# Patient Record
Sex: MALE | Race: BLACK OR AFRICAN AMERICAN | NOT HISPANIC OR LATINO | Employment: UNEMPLOYED | URBAN - METROPOLITAN AREA
[De-identification: names, ages, dates, MRNs, and addresses within clinical notes are randomized per-mention and may not be internally consistent; named-entity substitution may affect disease eponyms.]

---

## 2021-11-20 ENCOUNTER — HOSPITAL ENCOUNTER (EMERGENCY)
Facility: HOSPITAL | Age: 56
Discharge: HOME | End: 2021-11-20
Attending: EMERGENCY MEDICINE
Payer: MEDICAID

## 2021-11-20 VITALS
DIASTOLIC BLOOD PRESSURE: 88 MMHG | RESPIRATION RATE: 16 BRPM | HEART RATE: 80 BPM | OXYGEN SATURATION: 100 % | HEIGHT: 68 IN | BODY MASS INDEX: 28.34 KG/M2 | SYSTOLIC BLOOD PRESSURE: 145 MMHG | TEMPERATURE: 98 F | WEIGHT: 187 LBS

## 2021-11-20 DIAGNOSIS — R42 DIZZINESS: ICD-10-CM

## 2021-11-20 DIAGNOSIS — M25.569 KNEE PAIN: Primary | ICD-10-CM

## 2021-11-20 DIAGNOSIS — R06.02 SOB (SHORTNESS OF BREATH): ICD-10-CM

## 2021-11-20 DIAGNOSIS — R55 VAGAL REACTION: ICD-10-CM

## 2021-11-20 LAB
ALBUMIN SERPL BCP-MCNC: 3.9 G/DL (ref 3.5–5.2)
ALP SERPL-CCNC: 124 U/L (ref 55–135)
ALT SERPL W/O P-5'-P-CCNC: 15 U/L (ref 10–44)
ANION GAP SERPL CALC-SCNC: 9 MMOL/L (ref 8–16)
AST SERPL-CCNC: 18 U/L (ref 10–40)
BASOPHILS # BLD AUTO: 0.03 K/UL (ref 0–0.2)
BASOPHILS NFR BLD: 0.4 % (ref 0–1.9)
BILIRUB SERPL-MCNC: 0.9 MG/DL (ref 0.1–1)
BUN SERPL-MCNC: 13 MG/DL (ref 6–20)
CALCIUM SERPL-MCNC: 9.8 MG/DL (ref 8.7–10.5)
CHLORIDE SERPL-SCNC: 103 MMOL/L (ref 95–110)
CO2 SERPL-SCNC: 25 MMOL/L (ref 23–29)
CREAT SERPL-MCNC: 1 MG/DL (ref 0.5–1.4)
CRP SERPL-MCNC: 26.5 MG/L (ref 0–8.2)
DIFFERENTIAL METHOD: ABNORMAL
EOSINOPHIL # BLD AUTO: 0.3 K/UL (ref 0–0.5)
EOSINOPHIL NFR BLD: 3.8 % (ref 0–8)
ERYTHROCYTE [DISTWIDTH] IN BLOOD BY AUTOMATED COUNT: 14.4 % (ref 11.5–14.5)
ERYTHROCYTE [SEDIMENTATION RATE] IN BLOOD BY WESTERGREN METHOD: 96 MM/HR (ref 0–23)
EST. GFR  (AFRICAN AMERICAN): >60 ML/MIN/1.73 M^2
EST. GFR  (NON AFRICAN AMERICAN): >60 ML/MIN/1.73 M^2
GLUCOSE SERPL-MCNC: 106 MG/DL (ref 70–110)
HCT VFR BLD AUTO: 46.7 % (ref 40–54)
HGB BLD-MCNC: 14.6 G/DL (ref 14–18)
IMM GRANULOCYTES # BLD AUTO: 0.02 K/UL (ref 0–0.04)
IMM GRANULOCYTES NFR BLD AUTO: 0.3 % (ref 0–0.5)
LYMPHOCYTES # BLD AUTO: 1.1 K/UL (ref 1–4.8)
LYMPHOCYTES NFR BLD: 14.9 % (ref 18–48)
MCH RBC QN AUTO: 23.9 PG (ref 27–31)
MCHC RBC AUTO-ENTMCNC: 31.3 G/DL (ref 32–36)
MCV RBC AUTO: 76 FL (ref 82–98)
MONOCYTES # BLD AUTO: 0.7 K/UL (ref 0.3–1)
MONOCYTES NFR BLD: 8.7 % (ref 4–15)
NEUTROPHILS # BLD AUTO: 5.4 K/UL (ref 1.8–7.7)
NEUTROPHILS NFR BLD: 71.9 % (ref 38–73)
NRBC BLD-RTO: 0 /100 WBC
PLATELET # BLD AUTO: 194 K/UL (ref 150–450)
PMV BLD AUTO: 9 FL (ref 9.2–12.9)
POCT GLUCOSE: 104 MG/DL (ref 70–110)
POTASSIUM SERPL-SCNC: 4.6 MMOL/L (ref 3.5–5.1)
PROT SERPL-MCNC: 7.7 G/DL (ref 6–8.4)
RBC # BLD AUTO: 6.11 M/UL (ref 4.6–6.2)
SODIUM SERPL-SCNC: 137 MMOL/L (ref 136–145)
TROPONIN I SERPL DL<=0.01 NG/ML-MCNC: 0.02 NG/ML (ref 0–0.03)
WBC # BLD AUTO: 7.56 K/UL (ref 3.9–12.7)

## 2021-11-20 PROCEDURE — 85025 COMPLETE CBC W/AUTO DIFF WBC: CPT

## 2021-11-20 PROCEDURE — 99285 EMERGENCY DEPT VISIT HI MDM: CPT | Mod: 25

## 2021-11-20 PROCEDURE — 86140 C-REACTIVE PROTEIN: CPT

## 2021-11-20 PROCEDURE — 84484 ASSAY OF TROPONIN QUANT: CPT | Performed by: EMERGENCY MEDICINE

## 2021-11-20 PROCEDURE — 93010 EKG 12-LEAD: ICD-10-PCS | Mod: ,,, | Performed by: INTERNAL MEDICINE

## 2021-11-20 PROCEDURE — 80053 COMPREHEN METABOLIC PANEL: CPT

## 2021-11-20 PROCEDURE — 93005 ELECTROCARDIOGRAM TRACING: CPT

## 2021-11-20 PROCEDURE — 99284 PR EMERGENCY DEPT VISIT,LEVEL IV: ICD-10-PCS | Mod: 25,,, | Performed by: EMERGENCY MEDICINE

## 2021-11-20 PROCEDURE — 25000003 PHARM REV CODE 250

## 2021-11-20 PROCEDURE — 85652 RBC SED RATE AUTOMATED: CPT

## 2021-11-20 PROCEDURE — 99284 EMERGENCY DEPT VISIT MOD MDM: CPT | Mod: 25,,, | Performed by: EMERGENCY MEDICINE

## 2021-11-20 PROCEDURE — 93010 ELECTROCARDIOGRAM REPORT: CPT | Mod: 77,,, | Performed by: INTERNAL MEDICINE

## 2021-11-20 PROCEDURE — 82962 GLUCOSE BLOOD TEST: CPT

## 2021-11-20 PROCEDURE — 76882 US LMTD JT/FCL EVL NVASC XTR: CPT | Mod: 26,,, | Performed by: EMERGENCY MEDICINE

## 2021-11-20 PROCEDURE — 93010 EKG 12-LEAD: ICD-10-PCS | Mod: 77,,, | Performed by: INTERNAL MEDICINE

## 2021-11-20 PROCEDURE — 76882 PR  US,EXTREMITY,NONVASCULAR,REAL-TIME IMAGE,LIMITED: ICD-10-PCS | Mod: 26,,, | Performed by: EMERGENCY MEDICINE

## 2021-11-20 PROCEDURE — 93010 ELECTROCARDIOGRAM REPORT: CPT | Mod: ,,, | Performed by: INTERNAL MEDICINE

## 2021-11-20 RX ORDER — NAPROXEN 500 MG/1
500 TABLET ORAL
Status: COMPLETED | OUTPATIENT
Start: 2021-11-20 | End: 2021-11-20

## 2021-11-20 RX ORDER — ACETAMINOPHEN 500 MG
500 TABLET ORAL EVERY 6 HOURS PRN
Qty: 40 TABLET | Refills: 0 | Status: SHIPPED | OUTPATIENT
Start: 2021-11-20 | End: 2021-11-30

## 2021-11-20 RX ORDER — NAPROXEN 500 MG/1
500 TABLET ORAL 2 TIMES DAILY WITH MEALS
Qty: 60 TABLET | Refills: 0 | Status: SHIPPED | OUTPATIENT
Start: 2021-11-20

## 2021-11-20 RX ADMIN — NAPROXEN 500 MG: 500 TABLET ORAL at 09:11

## 2022-01-04 ENCOUNTER — TELEPHONE (OUTPATIENT)
Dept: ORTHOPEDICS | Facility: CLINIC | Age: 57
End: 2022-01-04
Payer: MEDICAID

## 2022-01-04 ENCOUNTER — OFFICE VISIT (OUTPATIENT)
Dept: ORTHOPEDICS | Facility: CLINIC | Age: 57
End: 2022-01-04
Payer: MEDICAID

## 2022-01-04 ENCOUNTER — HOSPITAL ENCOUNTER (OUTPATIENT)
Dept: RADIOLOGY | Facility: HOSPITAL | Age: 57
Discharge: HOME | End: 2022-01-04
Attending: ORTHOPAEDIC SURGERY
Payer: MEDICAID

## 2022-01-04 VITALS
BODY MASS INDEX: 28.29 KG/M2 | DIASTOLIC BLOOD PRESSURE: 87 MMHG | HEIGHT: 67 IN | HEART RATE: 82 BPM | SYSTOLIC BLOOD PRESSURE: 135 MMHG | WEIGHT: 180.25 LBS

## 2022-01-04 DIAGNOSIS — T84.84XA PAIN DUE TO TOTAL LEFT KNEE REPLACEMENT, INITIAL ENCOUNTER: Primary | ICD-10-CM

## 2022-01-04 DIAGNOSIS — M25.562 ACUTE PAIN OF LEFT KNEE: Primary | ICD-10-CM

## 2022-01-04 DIAGNOSIS — Z96.652 PAIN DUE TO TOTAL LEFT KNEE REPLACEMENT, INITIAL ENCOUNTER: Primary | ICD-10-CM

## 2022-01-04 DIAGNOSIS — M25.562 ACUTE PAIN OF LEFT KNEE: ICD-10-CM

## 2022-01-04 PROCEDURE — 3079F DIAST BP 80-89 MM HG: CPT | Mod: CPTII,,, | Performed by: ORTHOPAEDIC SURGERY

## 2022-01-04 PROCEDURE — 1159F PR MEDICATION LIST DOCUMENTED IN MEDICAL RECORD: ICD-10-PCS | Mod: CPTII,,, | Performed by: ORTHOPAEDIC SURGERY

## 2022-01-04 PROCEDURE — 99999 PR PBB SHADOW E&M-EST. PATIENT-LVL III: ICD-10-PCS | Mod: PBBFAC,,, | Performed by: ORTHOPAEDIC SURGERY

## 2022-01-04 PROCEDURE — 3075F SYST BP GE 130 - 139MM HG: CPT | Mod: CPTII,,, | Performed by: ORTHOPAEDIC SURGERY

## 2022-01-04 PROCEDURE — 73564 X-RAY EXAM KNEE 4 OR MORE: CPT | Mod: 26,LT,, | Performed by: RADIOLOGY

## 2022-01-04 PROCEDURE — 20610 DRAIN/INJ JOINT/BURSA W/O US: CPT | Mod: PBBFAC,PN,LT | Performed by: ORTHOPAEDIC SURGERY

## 2022-01-04 PROCEDURE — 3008F BODY MASS INDEX DOCD: CPT | Mod: CPTII,,, | Performed by: ORTHOPAEDIC SURGERY

## 2022-01-04 PROCEDURE — 73564 X-RAY EXAM KNEE 4 OR MORE: CPT | Mod: TC,FY,LT

## 2022-01-04 PROCEDURE — 99204 PR OFFICE/OUTPT VISIT, NEW, LEVL IV, 45-59 MIN: ICD-10-PCS | Mod: 25,S$PBB,, | Performed by: ORTHOPAEDIC SURGERY

## 2022-01-04 PROCEDURE — 99999 PR PBB SHADOW E&M-EST. PATIENT-LVL III: CPT | Mod: PBBFAC,,, | Performed by: ORTHOPAEDIC SURGERY

## 2022-01-04 PROCEDURE — 20610 LARGE JOINT ASPIRATION/INJECTION: L KNEE: ICD-10-PCS | Mod: S$PBB,LT,, | Performed by: ORTHOPAEDIC SURGERY

## 2022-01-04 PROCEDURE — 73564 XR KNEE COMP 4 OR MORE VIEWS LEFT: ICD-10-PCS | Mod: 26,LT,, | Performed by: RADIOLOGY

## 2022-01-04 PROCEDURE — 99204 OFFICE O/P NEW MOD 45 MIN: CPT | Mod: 25,S$PBB,, | Performed by: ORTHOPAEDIC SURGERY

## 2022-01-04 PROCEDURE — 3008F PR BODY MASS INDEX (BMI) DOCUMENTED: ICD-10-PCS | Mod: CPTII,,, | Performed by: ORTHOPAEDIC SURGERY

## 2022-01-04 PROCEDURE — 99213 OFFICE O/P EST LOW 20 MIN: CPT | Mod: PBBFAC,PN,25 | Performed by: ORTHOPAEDIC SURGERY

## 2022-01-04 PROCEDURE — 3075F PR MOST RECENT SYSTOLIC BLOOD PRESS GE 130-139MM HG: ICD-10-PCS | Mod: CPTII,,, | Performed by: ORTHOPAEDIC SURGERY

## 2022-01-04 PROCEDURE — 3079F PR MOST RECENT DIASTOLIC BLOOD PRESSURE 80-89 MM HG: ICD-10-PCS | Mod: CPTII,,, | Performed by: ORTHOPAEDIC SURGERY

## 2022-01-04 PROCEDURE — 1159F MED LIST DOCD IN RCRD: CPT | Mod: CPTII,,, | Performed by: ORTHOPAEDIC SURGERY

## 2022-01-11 ENCOUNTER — OFFICE VISIT (OUTPATIENT)
Dept: ORTHOPEDICS | Facility: CLINIC | Age: 57
End: 2022-01-11
Payer: MEDICAID

## 2022-01-11 VITALS
BODY MASS INDEX: 28.25 KG/M2 | DIASTOLIC BLOOD PRESSURE: 95 MMHG | HEIGHT: 67 IN | HEART RATE: 84 BPM | SYSTOLIC BLOOD PRESSURE: 148 MMHG | WEIGHT: 180 LBS

## 2022-01-11 DIAGNOSIS — T84.84XA PAIN DUE TO TOTAL LEFT KNEE REPLACEMENT, INITIAL ENCOUNTER: ICD-10-CM

## 2022-01-11 DIAGNOSIS — Z96.652 PAIN DUE TO TOTAL LEFT KNEE REPLACEMENT, INITIAL ENCOUNTER: ICD-10-CM

## 2022-01-11 DIAGNOSIS — T84.89XA POSTOPERATIVE STIFFNESS OF TOTAL KNEE REPLACEMENT, INITIAL ENCOUNTER: ICD-10-CM

## 2022-01-11 DIAGNOSIS — Z96.659 POSTOPERATIVE STIFFNESS OF TOTAL KNEE REPLACEMENT, INITIAL ENCOUNTER: ICD-10-CM

## 2022-01-11 DIAGNOSIS — M25.669 POSTOPERATIVE STIFFNESS OF TOTAL KNEE REPLACEMENT, INITIAL ENCOUNTER: ICD-10-CM

## 2022-01-11 DIAGNOSIS — G89.29 CHRONIC PAIN OF LEFT KNEE: Primary | ICD-10-CM

## 2022-01-11 DIAGNOSIS — M25.562 CHRONIC PAIN OF LEFT KNEE: Primary | ICD-10-CM

## 2022-01-11 PROCEDURE — 99213 OFFICE O/P EST LOW 20 MIN: CPT | Mod: PBBFAC,PN | Performed by: ORTHOPAEDIC SURGERY

## 2022-01-11 PROCEDURE — 3008F BODY MASS INDEX DOCD: CPT | Mod: CPTII,,, | Performed by: ORTHOPAEDIC SURGERY

## 2022-01-11 PROCEDURE — 99214 PR OFFICE/OUTPT VISIT, EST, LEVL IV, 30-39 MIN: ICD-10-PCS | Mod: S$PBB,,, | Performed by: ORTHOPAEDIC SURGERY

## 2022-01-11 PROCEDURE — 3080F PR MOST RECENT DIASTOLIC BLOOD PRESSURE >= 90 MM HG: ICD-10-PCS | Mod: CPTII,,, | Performed by: ORTHOPAEDIC SURGERY

## 2022-01-11 PROCEDURE — 3077F PR MOST RECENT SYSTOLIC BLOOD PRESSURE >= 140 MM HG: ICD-10-PCS | Mod: CPTII,,, | Performed by: ORTHOPAEDIC SURGERY

## 2022-01-11 PROCEDURE — 99999 PR PBB SHADOW E&M-EST. PATIENT-LVL III: ICD-10-PCS | Mod: PBBFAC,,, | Performed by: ORTHOPAEDIC SURGERY

## 2022-01-11 PROCEDURE — 1159F PR MEDICATION LIST DOCUMENTED IN MEDICAL RECORD: ICD-10-PCS | Mod: CPTII,,, | Performed by: ORTHOPAEDIC SURGERY

## 2022-01-11 PROCEDURE — 3077F SYST BP >= 140 MM HG: CPT | Mod: CPTII,,, | Performed by: ORTHOPAEDIC SURGERY

## 2022-01-11 PROCEDURE — 99999 PR PBB SHADOW E&M-EST. PATIENT-LVL III: CPT | Mod: PBBFAC,,, | Performed by: ORTHOPAEDIC SURGERY

## 2022-01-11 PROCEDURE — 99214 OFFICE O/P EST MOD 30 MIN: CPT | Mod: S$PBB,,, | Performed by: ORTHOPAEDIC SURGERY

## 2022-01-11 PROCEDURE — 3080F DIAST BP >= 90 MM HG: CPT | Mod: CPTII,,, | Performed by: ORTHOPAEDIC SURGERY

## 2022-01-11 PROCEDURE — 1159F MED LIST DOCD IN RCRD: CPT | Mod: CPTII,,, | Performed by: ORTHOPAEDIC SURGERY

## 2022-01-11 PROCEDURE — 3008F PR BODY MASS INDEX (BMI) DOCUMENTED: ICD-10-PCS | Mod: CPTII,,, | Performed by: ORTHOPAEDIC SURGERY

## 2022-01-11 RX ORDER — FAMOTIDINE 20 MG/1
20 TABLET, FILM COATED ORAL
COMMUNITY
Start: 2021-07-09 | End: 2022-07-09

## 2022-01-11 RX ORDER — ETODOLAC 500 MG/1
TABLET, FILM COATED ORAL
COMMUNITY

## 2022-01-11 RX ORDER — DICLOFENAC SODIUM 10 MG/G
GEL TOPICAL
COMMUNITY

## 2022-01-11 RX ORDER — LOSARTAN POTASSIUM AND HYDROCHLOROTHIAZIDE 12.5; 5 MG/1; MG/1
TABLET ORAL
COMMUNITY

## 2022-01-11 RX ORDER — GABAPENTIN 300 MG/1
300 CAPSULE ORAL
COMMUNITY
End: 2022-07-12

## 2022-01-11 RX ORDER — IBUPROFEN 600 MG/1
600 TABLET ORAL EVERY 8 HOURS PRN
COMMUNITY
Start: 2021-09-29 | End: 2022-09-15 | Stop reason: SDUPTHER

## 2022-01-11 RX ORDER — KETOCONAZOLE 20 MG/G
CREAM TOPICAL
COMMUNITY
Start: 2021-11-02

## 2022-01-11 NOTE — PROGRESS NOTES
[facility] [address]          Subjective:      Patient ID: Paulo Anderson is a 56 y.o. male.    Chief Complaint: Pain of the Left Knee    Patient is a 56 year old male hx of left knee arthritis who underwent left TKA on  at [facility] who presents today for swollen, painful and stiff knee. Patient states that he has had pain and stiffness since his surgery. Denies any time period where he was completely asymptomatic. He underwent 2 BLAINE's at [facility], neither of which improved his motion significantly. He has been going to therapy several times per week with minimal to no improvement. He denies any post operative drainage or need for antibiotics. Patient was seen by Dr. Kennedy 1 week ago who performed a left knee aspiration to rule out infection. Aspiration demonstrated no evidence of infection with negative alpha defensin and WBC count of 272. Today his main symptoms are associated with his stiffness and tenderness over the anterior knee and tibia. He ambulates with a crutch. Denies any trauma or injury.      History reviewed. No pertinent past medical history.     Past Surgical History:   Procedure Laterality Date    KNEE SURGERY  2021        Current Outpatient Medications   Medication Instructions    diclofenac sodium (VOLTAREN) 1 % Gel Voltaren 1 % topical gel   APPLY 2 GRAM TO THE AFFECTED AREA(S) BY TOPICAL ROUTE 4 TIMES PER DAY    etodolac (LODINE) 500 MG tablet etodolac 500 mg tablet   Take 1 tablet twice a day by oral route.    famotidine (PEPCID) 20 mg, Oral    gabapentin (NEURONTIN) 300 mg, Oral    ibuprofen (ADVIL,MOTRIN) 600 mg, Oral, Every 8 hours PRN    ketoconazole (NIZORAL) 2 % cream SMARTSI Topical Every Night    losartan-hydrochlorothiazide 50-12.5 mg (HYZAAR) 50-12.5 mg per tablet losartan 50 mg-hydrochlorothiazide 12.5 mg tablet   Take 1 tablet every day by oral route for 90 days.    multivitamin capsule 1 capsule, Oral    naproxen (NAPROSYN) 500 mg, Oral,  2 times daily with meals        Review of patient's allergies indicates:  No Known Allergies    Social History     Socioeconomic History    Marital status: Significant Other   Tobacco Use    Smoking status: Never Smoker    Smokeless tobacco: Never Used   Substance and Sexual Activity    Alcohol use: Never    Drug use: Never    Sexual activity: Yes       History reviewed. No pertinent family history.      No fevers, chills, nausea or vomiting. No shortness of breath or chest pain.         Objective:      General        Constitutional: appears stated age, well-developed and well-nourished    Scleral icterus: no    Labored breathing: no    Psychiatric: normal mood and affect and no acute distress    Neurological: alert and oriented x3    Skin: intact    Lymphadenopathy: none    LLE:  Prior surgical inicison is well healed with no erythema or induration  No evidence of drainage   +knee effusion   TTP over the patella and tibia.   TTP over pes tendon   ROM 15-70  Knee is stable on varus/valgus stress  Sensation intact to light touch distally  Pulses 2+        Imaging: On independent review of left knee radiographs patient is s/p left TKA. No evidence of periprosthetic fracture or loosening when compared to prior radiographs in November.     Assessment:        Paulo Anderson is a 56 y.o. male who is now 6 months status post Left TKA c/b pain and stiffness. Knee aspiration demonstrate no evidence of infection. Radiographs demonstrate no obvious signs of loosening.     Encounter Diagnoses   Name Primary?    Chronic pain of left knee Yes    Postoperative stiffness of total knee replacement, initial encounter     Pain due to total left knee replacement, initial encounter        Plan :  I have had a discussion with the patient that at this point we do not think that he would benefit from a repeat BLAINE. At this point there is no evidence of loosening or infection and no obvious source for his pain. Recommend continuing  daily activities to strengthen his quad function.    I discussed a new treatment therapy called Iovera, which is cryotherapy, to provide symptomatic relief along the sensory distribution of the infrapatellar tendon branch of the saphenous nerve, AFCN, and LFCN.  The patient elected to move forward with this.  We did discuss the fact that this is a fairly novel procedure and the is very limited scientific data around this; however, it is FDA approved.  In a few patients there can be continued pain along the treated nerve but the exact risk has not been quantified but seems to be rare. The patient was given patient information and literature to review prior to the procedure as well. Based on this, the patient feels the benefits outweigh the risks.     If patient continues to have pain will plan bone scan at 1 year to further evaluate knee of loosening.       .      Orders Placed This Encounter   Procedures    Jim Hodges MD   01/11/2022

## 2022-01-11 NOTE — PROCEDURES
Large Joint Aspiration/Injection: L knee    Date/Time: 1/4/2022 1:00 PM  Performed by: Brent Kennedy MD  Authorized by: Brent Kennedy MD     Consent Done?:  Yes (Verbal)  Indications:  Pain and diagnostic evaluation  Site marked: the procedure site was marked    Timeout: prior to procedure the correct patient, procedure, and site was verified    Prep: patient was prepped and draped in usual sterile fashion      Local anesthesia used?: Yes    Anesthesia:  Local infiltration  Local anesthetic:  Lidocaine 1% without epinephrine  Anesthetic total (ml):  8      Details:  Needle Size:  18 G  Ultrasonic Guidance for needle placement?: No    Approach:  Lateral  Location:  Knee  Site:  L knee  Aspirate amount (mL):  6  Aspirate:  Blood-tinged  Lab: fluid sent for laboratory analysis    Patient tolerance:  Patient tolerated the procedure well with no immediate complications

## 2022-01-25 ENCOUNTER — PROCEDURE VISIT (OUTPATIENT)
Dept: ORTHOPEDICS | Facility: CLINIC | Age: 57
End: 2022-01-25
Payer: MEDICAID

## 2022-01-25 VITALS
HEIGHT: 67 IN | DIASTOLIC BLOOD PRESSURE: 91 MMHG | HEART RATE: 80 BPM | BODY MASS INDEX: 28.46 KG/M2 | SYSTOLIC BLOOD PRESSURE: 146 MMHG | WEIGHT: 181.31 LBS

## 2022-01-25 DIAGNOSIS — G89.29 CHRONIC PAIN OF LEFT KNEE: ICD-10-CM

## 2022-01-25 DIAGNOSIS — T84.84XA PAIN DUE TO TOTAL LEFT KNEE REPLACEMENT, INITIAL ENCOUNTER: ICD-10-CM

## 2022-01-25 DIAGNOSIS — M25.669 POSTOPERATIVE STIFFNESS OF TOTAL KNEE REPLACEMENT, INITIAL ENCOUNTER: ICD-10-CM

## 2022-01-25 DIAGNOSIS — M25.562 CHRONIC PAIN OF LEFT KNEE: ICD-10-CM

## 2022-01-25 DIAGNOSIS — T84.89XA POSTOPERATIVE STIFFNESS OF TOTAL KNEE REPLACEMENT, INITIAL ENCOUNTER: ICD-10-CM

## 2022-01-25 DIAGNOSIS — Z96.659 POSTOPERATIVE STIFFNESS OF TOTAL KNEE REPLACEMENT, INITIAL ENCOUNTER: ICD-10-CM

## 2022-01-25 DIAGNOSIS — Z96.652 PAIN DUE TO TOTAL LEFT KNEE REPLACEMENT, INITIAL ENCOUNTER: ICD-10-CM

## 2022-01-25 PROCEDURE — 64640 INJECTION TREATMENT OF NERVE: CPT | Mod: PBBFAC,PN | Performed by: ORTHOPAEDIC SURGERY

## 2022-01-25 PROCEDURE — 64640 INJECTION TREATMENT OF NERVE: CPT | Mod: S$PBB,LT,, | Performed by: ORTHOPAEDIC SURGERY

## 2022-01-25 PROCEDURE — 99499 NO LOS: ICD-10-PCS | Mod: S$PBB,,, | Performed by: ORTHOPAEDIC SURGERY

## 2022-01-25 PROCEDURE — 64640 PR DESTRUCT BY NEURO AGENT; OTHER PERIPH NERVE: ICD-10-PCS | Mod: S$PBB,LT,, | Performed by: ORTHOPAEDIC SURGERY

## 2022-01-25 PROCEDURE — 99499 UNLISTED E&M SERVICE: CPT | Mod: S$PBB,,, | Performed by: ORTHOPAEDIC SURGERY

## 2022-01-25 RX ORDER — OXYCODONE HYDROCHLORIDE 100 MG/5ML
SOLUTION ORAL
Status: ON HOLD | COMMUNITY
Start: 2021-07-09 | End: 2022-11-07 | Stop reason: HOSPADM

## 2022-01-25 RX ORDER — TRIAMCINOLONE ACETONIDE 1 MG/G
OINTMENT TOPICAL
COMMUNITY
Start: 2021-12-03

## 2022-01-25 RX ORDER — OXYCODONE AND ACETAMINOPHEN 10; 325 MG/1; MG/1
1 TABLET ORAL EVERY 6 HOURS PRN
Status: ON HOLD | COMMUNITY
Start: 2021-12-17 | End: 2022-11-07 | Stop reason: HOSPADM

## 2022-01-25 RX ORDER — PIMECROLIMUS 10 MG/G
CREAM TOPICAL
COMMUNITY
Start: 2021-11-02

## 2022-01-25 RX ORDER — METHYLPREDNISOLONE 4 MG/1
TABLET ORAL
COMMUNITY
Start: 2021-11-29

## 2022-01-25 NOTE — PROCEDURES
Procedures   Procedure Note Iovera:    DATE OF PROCEDURE:  01/25/2022    PREOPERATIVE DIAGNOSIS: left knee pain.     POSTOPERATIVE DIAGNOSIS: left knee pain.     PROCEDURE: Iovera treatment of anterior femoral cutaneous nerve, Lateral femoral cut nerve, and both branches of infrapatellar saphenous nerve using at least 4 different punctures to treat all 4 nerves. (cpt 64640 x4)    ATTENDING SURGEON: Ryley Raygoza M.D.   ASSISTANT: verenice cardenas    COMPLICATIONS: None.     IMPLANTS:  None    ESTIMATED BLOOD LOSS:  < 5cc    SPECIMENS REMOVED:  None    ANESTHESIA: Local lidocaine    INDICATIONS FOR PROCEDURE: This is a 56 y.o. male with longstanding knee pain. They have failed non operative management including injections.I discussed a new treatment therapy called Iovera, which is cryotherapy, to provide symptomatic relief along the sensory distribution of the infrapatellar tendon branch of the saphenous nerve  and AFCN. The patient elected to move forward with this  We did discuss the fact that this is a fairly novel procedure and there is very limited scientific data around this.  However, it FDA approved.  The patient was given patient information and literature to review prior to the procedure as well.  Based on this, the patient agreed to move forward with doing the procedure.      PROCEDURE:    The patient was placed supine on the exam table and the proximal medial aspect of the left tibia and anterior aspect of distal femur was prepped with sterile Betadine and alcohol.  A line was drawn extending approximately 5 cm medial to inferior pole of the patella distally to a point approximately 5 cm medial to the tibial tubercle.  A second line was drawn in a medial to lateral direction the width of the patella approximately 7 cm proximal to the patella. We then infiltrated the skin with lidocaine along both lines using a 25g needle. We then introduced the Iovera device along these lines and this device penetrated the skin,  creating cryotherapy to both branches of the infrapatellar saphenous nerve, a third treatment to the anterior femoral cutaneous nerve, and fourth LFCN. 7 punctures of the skin were made to treat the 2 branches of the ISN and another 7 punctures were made to treat the AFCN and LFCN. There were a total of 4 nerves treated with iovera. The patient tolerated the procedure well with no problems.

## 2022-07-08 DIAGNOSIS — T84.84XA PAIN DUE TO TOTAL LEFT KNEE REPLACEMENT, INITIAL ENCOUNTER: Primary | ICD-10-CM

## 2022-07-08 DIAGNOSIS — Z96.652 PAIN DUE TO TOTAL LEFT KNEE REPLACEMENT, INITIAL ENCOUNTER: Primary | ICD-10-CM

## 2022-07-12 ENCOUNTER — HOSPITAL ENCOUNTER (OUTPATIENT)
Dept: RADIOLOGY | Facility: HOSPITAL | Age: 57
Discharge: HOME | End: 2022-07-12
Attending: ORTHOPAEDIC SURGERY
Payer: MEDICAID

## 2022-07-12 ENCOUNTER — OFFICE VISIT (OUTPATIENT)
Dept: ORTHOPEDICS | Facility: CLINIC | Age: 57
End: 2022-07-12
Payer: MEDICAID

## 2022-07-12 VITALS
WEIGHT: 178.69 LBS | HEART RATE: 65 BPM | BODY MASS INDEX: 28.04 KG/M2 | HEIGHT: 67 IN | DIASTOLIC BLOOD PRESSURE: 90 MMHG | SYSTOLIC BLOOD PRESSURE: 142 MMHG

## 2022-07-12 DIAGNOSIS — M47.817 SPONDYLOSIS WITHOUT MYELOPATHY OR RADICULOPATHY, LUMBOSACRAL REGION: ICD-10-CM

## 2022-07-12 DIAGNOSIS — T84.84XA PAIN DUE TO TOTAL LEFT KNEE REPLACEMENT, INITIAL ENCOUNTER: ICD-10-CM

## 2022-07-12 DIAGNOSIS — Z96.652 PRESENCE OF LEFT ARTIFICIAL KNEE JOINT: ICD-10-CM

## 2022-07-12 DIAGNOSIS — Z96.652 PAIN DUE TO TOTAL LEFT KNEE REPLACEMENT, INITIAL ENCOUNTER: ICD-10-CM

## 2022-07-12 DIAGNOSIS — M54.9 DORSALGIA, UNSPECIFIED: ICD-10-CM

## 2022-07-12 DIAGNOSIS — Z96.652 AFTERCARE FOLLOWING LEFT KNEE JOINT REPLACEMENT SURGERY: Primary | ICD-10-CM

## 2022-07-12 DIAGNOSIS — R20.8 ALLODYNIA: ICD-10-CM

## 2022-07-12 DIAGNOSIS — Z47.1 AFTERCARE FOLLOWING LEFT KNEE JOINT REPLACEMENT SURGERY: Primary | ICD-10-CM

## 2022-07-12 PROCEDURE — 3008F BODY MASS INDEX DOCD: CPT | Mod: CPTII,,, | Performed by: ORTHOPAEDIC SURGERY

## 2022-07-12 PROCEDURE — 72100 X-RAY EXAM L-S SPINE 2/3 VWS: CPT | Mod: TC,FY

## 2022-07-12 PROCEDURE — 73562 XR KNEE 3 VIEW LEFT: ICD-10-PCS | Mod: 26,59,LT, | Performed by: STUDENT IN AN ORGANIZED HEALTH CARE EDUCATION/TRAINING PROGRAM

## 2022-07-12 PROCEDURE — 99999 PR PBB SHADOW E&M-EST. PATIENT-LVL IV: ICD-10-PCS | Mod: PBBFAC,,, | Performed by: ORTHOPAEDIC SURGERY

## 2022-07-12 PROCEDURE — 99214 PR OFFICE/OUTPT VISIT, EST, LEVL IV, 30-39 MIN: ICD-10-PCS | Mod: S$PBB,,, | Performed by: ORTHOPAEDIC SURGERY

## 2022-07-12 PROCEDURE — 77073 BONE LENGTH STUDIES: CPT | Mod: TC,FY

## 2022-07-12 PROCEDURE — 1159F PR MEDICATION LIST DOCUMENTED IN MEDICAL RECORD: ICD-10-PCS | Mod: CPTII,,, | Performed by: ORTHOPAEDIC SURGERY

## 2022-07-12 PROCEDURE — 3008F PR BODY MASS INDEX (BMI) DOCUMENTED: ICD-10-PCS | Mod: CPTII,,, | Performed by: ORTHOPAEDIC SURGERY

## 2022-07-12 PROCEDURE — 1159F MED LIST DOCD IN RCRD: CPT | Mod: CPTII,,, | Performed by: ORTHOPAEDIC SURGERY

## 2022-07-12 PROCEDURE — 3077F SYST BP >= 140 MM HG: CPT | Mod: CPTII,,, | Performed by: ORTHOPAEDIC SURGERY

## 2022-07-12 PROCEDURE — 77073 BONE LENGTH STUDIES: CPT | Mod: 26,,, | Performed by: STUDENT IN AN ORGANIZED HEALTH CARE EDUCATION/TRAINING PROGRAM

## 2022-07-12 PROCEDURE — 72100 XR LUMBAR SPINE 2 OR 3 VIEWS: ICD-10-PCS | Mod: 26,,, | Performed by: STUDENT IN AN ORGANIZED HEALTH CARE EDUCATION/TRAINING PROGRAM

## 2022-07-12 PROCEDURE — 3080F PR MOST RECENT DIASTOLIC BLOOD PRESSURE >= 90 MM HG: ICD-10-PCS | Mod: CPTII,,, | Performed by: ORTHOPAEDIC SURGERY

## 2022-07-12 PROCEDURE — 3080F DIAST BP >= 90 MM HG: CPT | Mod: CPTII,,, | Performed by: ORTHOPAEDIC SURGERY

## 2022-07-12 PROCEDURE — 73562 X-RAY EXAM OF KNEE 3: CPT | Mod: 26,59,LT, | Performed by: STUDENT IN AN ORGANIZED HEALTH CARE EDUCATION/TRAINING PROGRAM

## 2022-07-12 PROCEDURE — 99999 PR PBB SHADOW E&M-EST. PATIENT-LVL IV: CPT | Mod: PBBFAC,,, | Performed by: ORTHOPAEDIC SURGERY

## 2022-07-12 PROCEDURE — 99214 OFFICE O/P EST MOD 30 MIN: CPT | Mod: S$PBB,,, | Performed by: ORTHOPAEDIC SURGERY

## 2022-07-12 PROCEDURE — 77073 XR HIP TO ANKLE: ICD-10-PCS | Mod: 26,,, | Performed by: STUDENT IN AN ORGANIZED HEALTH CARE EDUCATION/TRAINING PROGRAM

## 2022-07-12 PROCEDURE — 73562 X-RAY EXAM OF KNEE 3: CPT | Mod: TC,FY,LT

## 2022-07-12 PROCEDURE — 99214 OFFICE O/P EST MOD 30 MIN: CPT | Mod: PBBFAC,PN | Performed by: ORTHOPAEDIC SURGERY

## 2022-07-12 PROCEDURE — 3077F PR MOST RECENT SYSTOLIC BLOOD PRESSURE >= 140 MM HG: ICD-10-PCS | Mod: CPTII,,, | Performed by: ORTHOPAEDIC SURGERY

## 2022-07-12 PROCEDURE — 72100 X-RAY EXAM L-S SPINE 2/3 VWS: CPT | Mod: 26,,, | Performed by: STUDENT IN AN ORGANIZED HEALTH CARE EDUCATION/TRAINING PROGRAM

## 2022-07-12 RX ORDER — GABAPENTIN 300 MG/1
600 CAPSULE ORAL 2 TIMES DAILY
Qty: 120 CAPSULE | Refills: 0 | Status: SHIPPED | OUTPATIENT
Start: 2022-07-12 | End: 2022-09-15 | Stop reason: SDUPTHER

## 2022-07-12 NOTE — PROGRESS NOTES
Subjective:      Patient ID: Paulo Anderson is a 56 y.o. male.    Chief Complaint: Pain of the Left Knee    Patient is approximately 1 year out from a primary total knee replacement done at [facility].  He is complaining of continued ongoing knee pain.  He complains of pain even when water touches his skin.  He also reports occasional pain running up and down his leg.  No recent history of trauma.  Infection workup previously was negative.    Social History     Occupational History    Not on file   Tobacco Use    Smoking status: Never Smoker    Smokeless tobacco: Never Used   Substance and Sexual Activity    Alcohol use: Never    Drug use: Never    Sexual activity: Yes      Review of Systems   Constitutional: Negative for diaphoresis.   HENT: Negative for ear discharge, nosebleeds and stridor.    Eyes: Negative for photophobia.   Cardiovascular: Negative for syncope.   Respiratory: Negative for hemoptysis, shortness of breath and wheezing.    Neurological: Negative for tremors.   Psychiatric/Behavioral: Negative.          Objective:    Ortho/SPM Exam   Exam today shows well-healed incision.  Intact extensor mechanism.  Range of motion is from 0 to about 100°.  He has significant discomfort and tenderness when I lightly touched the medial aspect of his knee.  He has a questionable seated straight leg raise.  Relatively pain-free range of motion.  Knee stable to varus valgus stress.  No AP instable      Assessment:       1. Aftercare following left knee joint replacement surgery    2. Allodynia    3. Presence of left artificial knee joint    4. Dorsalgia, unspecified    5. Spondylosis without myelopathy or radiculopathy, lumbosacral region          Plan:       Patient does have allodynia around his knee.  He may have CRPS.  Go ahead and start a course of gabapentin.  Also like to get lumbar spine x-rays.  We will also obtain bone scan since he is approximately 1 year out from his total knee replacement to  look for any signs of loosening.  He does have CRPS then I think the next step would be evaluation by pain management specialist.  However given his insurance I am not sure how easy that will be.

## 2022-08-31 ENCOUNTER — HOSPITAL ENCOUNTER (OUTPATIENT)
Dept: RADIOLOGY | Facility: HOSPITAL | Age: 57
Discharge: HOME | End: 2022-08-31
Attending: ORTHOPAEDIC SURGERY
Payer: MEDICAID

## 2022-08-31 DIAGNOSIS — Z96.652 PRESENCE OF LEFT ARTIFICIAL KNEE JOINT: ICD-10-CM

## 2022-08-31 PROCEDURE — A9503 TC99M MEDRONATE: HCPCS

## 2022-08-31 PROCEDURE — 78315 NM BONE SCAN 3 PHASE KNEE: ICD-10-PCS | Mod: 26,,, | Performed by: STUDENT IN AN ORGANIZED HEALTH CARE EDUCATION/TRAINING PROGRAM

## 2022-08-31 PROCEDURE — 78315 BONE IMAGING 3 PHASE: CPT | Mod: 26,,, | Performed by: STUDENT IN AN ORGANIZED HEALTH CARE EDUCATION/TRAINING PROGRAM

## 2022-09-15 ENCOUNTER — RESEARCH ENCOUNTER (OUTPATIENT)
Dept: RESEARCH | Facility: HOSPITAL | Age: 57
End: 2022-09-15
Payer: MEDICAID

## 2022-09-15 ENCOUNTER — OFFICE VISIT (OUTPATIENT)
Dept: ORTHOPEDICS | Facility: CLINIC | Age: 57
End: 2022-09-15
Payer: MEDICAID

## 2022-09-15 VITALS
WEIGHT: 178 LBS | DIASTOLIC BLOOD PRESSURE: 93 MMHG | SYSTOLIC BLOOD PRESSURE: 151 MMHG | BODY MASS INDEX: 27.94 KG/M2 | HEIGHT: 67 IN | HEART RATE: 76 BPM

## 2022-09-15 DIAGNOSIS — T84.84XA PAIN DUE TO TOTAL LEFT KNEE REPLACEMENT, INITIAL ENCOUNTER: Primary | ICD-10-CM

## 2022-09-15 DIAGNOSIS — Z96.652 PAIN DUE TO TOTAL LEFT KNEE REPLACEMENT, INITIAL ENCOUNTER: Primary | ICD-10-CM

## 2022-09-15 PROCEDURE — 3080F PR MOST RECENT DIASTOLIC BLOOD PRESSURE >= 90 MM HG: ICD-10-PCS | Mod: CPTII,,, | Performed by: ORTHOPAEDIC SURGERY

## 2022-09-15 PROCEDURE — 3080F DIAST BP >= 90 MM HG: CPT | Mod: CPTII,,, | Performed by: ORTHOPAEDIC SURGERY

## 2022-09-15 PROCEDURE — 99214 PR OFFICE/OUTPT VISIT, EST, LEVL IV, 30-39 MIN: ICD-10-PCS | Mod: S$PBB,,, | Performed by: ORTHOPAEDIC SURGERY

## 2022-09-15 PROCEDURE — 99999 PR PBB SHADOW E&M-EST. PATIENT-LVL III: ICD-10-PCS | Mod: PBBFAC,,, | Performed by: ORTHOPAEDIC SURGERY

## 2022-09-15 PROCEDURE — 1159F MED LIST DOCD IN RCRD: CPT | Mod: CPTII,,, | Performed by: ORTHOPAEDIC SURGERY

## 2022-09-15 PROCEDURE — 99999 PR PBB SHADOW E&M-EST. PATIENT-LVL III: CPT | Mod: PBBFAC,,, | Performed by: ORTHOPAEDIC SURGERY

## 2022-09-15 PROCEDURE — 99214 OFFICE O/P EST MOD 30 MIN: CPT | Mod: S$PBB,,, | Performed by: ORTHOPAEDIC SURGERY

## 2022-09-15 PROCEDURE — 3077F PR MOST RECENT SYSTOLIC BLOOD PRESSURE >= 140 MM HG: ICD-10-PCS | Mod: CPTII,,, | Performed by: ORTHOPAEDIC SURGERY

## 2022-09-15 PROCEDURE — 3008F PR BODY MASS INDEX (BMI) DOCUMENTED: ICD-10-PCS | Mod: CPTII,,, | Performed by: ORTHOPAEDIC SURGERY

## 2022-09-15 PROCEDURE — 99213 OFFICE O/P EST LOW 20 MIN: CPT | Mod: PBBFAC,PN | Performed by: ORTHOPAEDIC SURGERY

## 2022-09-15 PROCEDURE — 3008F BODY MASS INDEX DOCD: CPT | Mod: CPTII,,, | Performed by: ORTHOPAEDIC SURGERY

## 2022-09-15 PROCEDURE — 1159F PR MEDICATION LIST DOCUMENTED IN MEDICAL RECORD: ICD-10-PCS | Mod: CPTII,,, | Performed by: ORTHOPAEDIC SURGERY

## 2022-09-15 PROCEDURE — 3077F SYST BP >= 140 MM HG: CPT | Mod: CPTII,,, | Performed by: ORTHOPAEDIC SURGERY

## 2022-09-15 RX ORDER — GABAPENTIN 300 MG/1
600 CAPSULE ORAL 2 TIMES DAILY
Qty: 120 CAPSULE | Refills: 0 | Status: SHIPPED | OUTPATIENT
Start: 2022-09-15 | End: 2023-06-27

## 2022-09-15 RX ORDER — IBUPROFEN 600 MG/1
600 TABLET ORAL EVERY 8 HOURS PRN
Qty: 90 TABLET | Refills: 0 | Status: SHIPPED | OUTPATIENT
Start: 2022-09-15 | End: 2022-10-15

## 2022-09-15 NOTE — PROGRESS NOTES
Protocol: innovations in Genicular Outcomes Registry (Sindy)  Protocol#: Sindy  IRB#: 2021.156  Version Date: 18-Mar-2022  PI: Ryley Raygoza MD  Patient Initials: C.T.     Patient states his interest in study. He will review paper copy of consent form, which was sent via email, and will decide to participate in study at next treatment appointment (Iovera on 9-).      Informed Consent Process:     Research team call patient after clinic to discuss above mentioned protocol. Patient is alert and oriented x 3. Mood and affect appropriate to the situation. Patient states that he is not participating in any other research studies. Patient states understanding about the diagnosis of osteoarthritis of the knee and of the procedure to being done on that knee.  Purpose of study reviewed with the patient.  Patient states understanding of this information.   Length of study and number of participants reviewed with patient; patient states understanding of the length of the study.   Study procedure discussed in detail with patient. Patient states understanding of this information.  Potential benefits of study along with costs and/or payment reimbursement per insurance discussed with patient; Patient states understanding that insurance will cover cost of all standard of care medications and procedures. Patient aware of $20 payment for qualifying visits with completed questionnaires.  Alternative treatment methods discussed with patient; Patient states understanding of this information.   Study related questions/compensation for injury, and whom to contact regarding rights as a research subject all reviewed with patient; Patient verbalizes understanding of this information.   Voluntary participation and withdrawal from research stressed to patient; patient states understanding that he may withdraw consent at any time without compromise to care.   Confidentiality and HIPAA discussed with patient. Patient verbalizes understanding  of this information.      Patient states his interest in study. He will review paper copy of consent form, which was sent via email, and will decide to participate in study at next treatment appointment (Iovera on 9-).

## 2022-09-15 NOTE — PROGRESS NOTES
Subjective:      Patient ID: Paulo Anderson is a 57 y.o. male.    Chief Complaint: No chief complaint on file.    Patient presents today for follow-up.  Neurontin did give him some relief.  Bone scan shows increased uptake around his knee replacement.    Social History     Occupational History    Not on file   Tobacco Use    Smoking status: Never    Smokeless tobacco: Never   Substance and Sexual Activity    Alcohol use: Never    Drug use: Never    Sexual activity: Yes      Review of Systems   Constitutional: Negative for diaphoresis.   HENT:  Negative for ear discharge, nosebleeds and stridor.    Eyes:  Negative for photophobia.   Cardiovascular:  Negative for syncope.   Respiratory:  Negative for hemoptysis, shortness of breath and wheezing.    Neurological:  Negative for tremors.   Psychiatric/Behavioral: Negative.         Objective:    Ortho/SPM Exam       Assessment:       1. Pain due to total left knee replacement, initial encounter          Plan:       Will go ahead and reorder his gabapentin.  He would like to try iovera again.  He had good relief with that.  Also try hinged range-of-motion knee brace to give him some stability.  Bone scan does show significant uptake around the left total knee replacement however this is an uncemented knee the S we may want to wait for some time before considering revision total knee replacement.

## 2022-09-29 ENCOUNTER — RESEARCH ENCOUNTER (OUTPATIENT)
Dept: RESEARCH | Facility: HOSPITAL | Age: 57
End: 2022-09-29
Payer: MEDICAID

## 2022-09-29 ENCOUNTER — PROCEDURE VISIT (OUTPATIENT)
Dept: ORTHOPEDICS | Facility: CLINIC | Age: 57
End: 2022-09-29
Payer: MEDICAID

## 2022-09-29 VITALS
DIASTOLIC BLOOD PRESSURE: 94 MMHG | SYSTOLIC BLOOD PRESSURE: 162 MMHG | WEIGHT: 180.56 LBS | BODY MASS INDEX: 28.34 KG/M2 | HEART RATE: 68 BPM | HEIGHT: 67 IN

## 2022-09-29 DIAGNOSIS — T84.84XA PAIN DUE TO TOTAL LEFT KNEE REPLACEMENT, INITIAL ENCOUNTER: ICD-10-CM

## 2022-09-29 DIAGNOSIS — Z96.652 PAIN DUE TO TOTAL LEFT KNEE REPLACEMENT, INITIAL ENCOUNTER: ICD-10-CM

## 2022-09-29 PROCEDURE — 99499 NO LOS: ICD-10-PCS | Mod: S$PBB,,, | Performed by: PHYSICIAN ASSISTANT

## 2022-09-29 PROCEDURE — 64640 INJECTION TREATMENT OF NERVE: CPT | Mod: S$PBB,LT,, | Performed by: PHYSICIAN ASSISTANT

## 2022-09-29 PROCEDURE — 99499 UNLISTED E&M SERVICE: CPT | Mod: S$PBB,,, | Performed by: PHYSICIAN ASSISTANT

## 2022-09-29 PROCEDURE — 64640 PR DESTRUCT BY NEURO AGENT; OTHER PERIPH NERVE: ICD-10-PCS | Mod: S$PBB,LT,, | Performed by: PHYSICIAN ASSISTANT

## 2022-09-29 PROCEDURE — 64640 INJECTION TREATMENT OF NERVE: CPT | Mod: PBBFAC,PN | Performed by: PHYSICIAN ASSISTANT

## 2022-09-29 NOTE — PROCEDURES
Procedures    Procedure Note Iovera:    DATE OF PROCEDURE:  (date: 9/29/22)    PREOPERATIVE DIAGNOSIS: left knee pain.     POSTOPERATIVE DIAGNOSIS: left knee pain.     PROCEDURE: Iovera treatment of anterior femoral cutaneous nerve, Lateral femoral cut nerve, and both branches of infrapatellar saphenous nerve using at least 4 different punctures to treat all 4 nerves. (cpt 64640 x3)    COMPLICATIONS: None.     IMPLANTS:  None    ESTIMATED BLOOD LOSS:  < 5cc    SPECIMENS REMOVED:  None    ANESTHESIA: Local lidocaine    INDICATIONS FOR PROCEDURE: This is a 57 y.o. male with longstanding knee pain. They have failed non operative management including injections.I discussed a new treatment therapy called Iovera, which is cryotherapy, to provide symptomatic relief along the sensory distribution of the infrapatellar tendon branch of the saphenous nerve  and AFCN. The patient elected to move forward with this  We did discuss the fact that this is a fairly novel procedure and there is very limited scientific data around this.  However, it FDA approved.  The patient was given patient information and literature to review prior to the procedure as well.  Based on this, the patient agreed to move forward with doing the procedure.      PROCEDURE:    The patient was placed supine on the exam table and the proximal medial aspect of the left tibia and anterior aspect of distal femur was prepped with sterile Betadine and alcohol.  A line was drawn extending approximately 5 cm medial to inferior pole of the patella distally to a point approximately 5 cm medial to the tibial tubercle.  A second line was drawn in a medial to lateral direction the width of the patella approximately 7 cm proximal to the patella. We then infiltrated the skin with lidocaine along both lines using a 25g needle. We then introduced the Iovera device along these lines and this device penetrated the skin, creating cryotherapy to both branches of the  infrapatellar saphenous nerve, a third treatment to the anterior femoral cutaneous nerve, and fourth LFCN. 7 punctures of the skin were made to treat the 2 branches of the ISN and another 7 punctures were made to treat the AFCN and LFCN. There were a total of 4 nerves treated with iovera of each knee. The patient tolerated the procedure well with no problems.

## 2022-09-29 NOTE — PROGRESS NOTES
Protocol: innovations in Genicular Outcomes Registry (Sindy)  Protocol#: Sindy  IRB#: 2021.156  Version Date: 18-Mar-2022  PI: Ryley Raygoza MD  Patient Initials: C.T.     Declined Study    Patient was scheduled for Iovera treatment today for OA pain. Previously had TKA to left knee but may be in need for revision at a later date. Research personnel approached patient and family member in private clinic to again discuss the above mentioned study to see if he wanted to participate. Patient declined study at this time but stated he may be interest in the future if more OA treatments are scheduled.

## 2022-10-25 ENCOUNTER — CLINICAL SUPPORT (OUTPATIENT)
Dept: LAB | Facility: HOSPITAL | Age: 57
End: 2022-10-25
Attending: ORTHOPAEDIC SURGERY
Payer: MEDICAID

## 2022-10-25 ENCOUNTER — HOSPITAL ENCOUNTER (OUTPATIENT)
Dept: RADIOLOGY | Facility: HOSPITAL | Age: 57
Discharge: HOME | End: 2022-10-25
Attending: ORTHOPAEDIC SURGERY
Payer: MEDICAID

## 2022-10-25 ENCOUNTER — OFFICE VISIT (OUTPATIENT)
Dept: ORTHOPEDICS | Facility: CLINIC | Age: 57
End: 2022-10-25
Payer: MEDICAID

## 2022-10-25 VITALS
DIASTOLIC BLOOD PRESSURE: 78 MMHG | BODY MASS INDEX: 28.25 KG/M2 | HEART RATE: 75 BPM | SYSTOLIC BLOOD PRESSURE: 133 MMHG | HEIGHT: 67 IN | WEIGHT: 180 LBS

## 2022-10-25 DIAGNOSIS — Z96.652 PAIN DUE TO TOTAL LEFT KNEE REPLACEMENT, INITIAL ENCOUNTER: Primary | ICD-10-CM

## 2022-10-25 DIAGNOSIS — M25.562 CHRONIC PAIN OF LEFT KNEE: ICD-10-CM

## 2022-10-25 DIAGNOSIS — T84.84XA PAIN DUE TO TOTAL LEFT KNEE REPLACEMENT, INITIAL ENCOUNTER: ICD-10-CM

## 2022-10-25 DIAGNOSIS — T84.84XA PAIN DUE TO TOTAL LEFT KNEE REPLACEMENT, INITIAL ENCOUNTER: Primary | ICD-10-CM

## 2022-10-25 DIAGNOSIS — G89.29 CHRONIC PAIN OF LEFT KNEE: ICD-10-CM

## 2022-10-25 DIAGNOSIS — Z96.652 PAIN DUE TO TOTAL LEFT KNEE REPLACEMENT, INITIAL ENCOUNTER: ICD-10-CM

## 2022-10-25 PROCEDURE — 99215 OFFICE O/P EST HI 40 MIN: CPT | Mod: PBBFAC,PN | Performed by: ORTHOPAEDIC SURGERY

## 2022-10-25 PROCEDURE — 99999 PR PBB SHADOW E&M-EST. PATIENT-LVL V: ICD-10-PCS | Mod: PBBFAC,,, | Performed by: ORTHOPAEDIC SURGERY

## 2022-10-25 PROCEDURE — 93010 EKG 12-LEAD: ICD-10-PCS | Mod: ,,, | Performed by: INTERNAL MEDICINE

## 2022-10-25 PROCEDURE — 93005 ELECTROCARDIOGRAM TRACING: CPT

## 2022-10-25 PROCEDURE — 3075F SYST BP GE 130 - 139MM HG: CPT | Mod: CPTII,,, | Performed by: ORTHOPAEDIC SURGERY

## 2022-10-25 PROCEDURE — 99999 PR PBB SHADOW E&M-EST. PATIENT-LVL V: CPT | Mod: PBBFAC,,, | Performed by: ORTHOPAEDIC SURGERY

## 2022-10-25 PROCEDURE — 71045 XR CHEST 1 VIEW PRE-OP: ICD-10-PCS | Mod: 26,,, | Performed by: RADIOLOGY

## 2022-10-25 PROCEDURE — 93010 ELECTROCARDIOGRAM REPORT: CPT | Mod: ,,, | Performed by: INTERNAL MEDICINE

## 2022-10-25 PROCEDURE — 3078F PR MOST RECENT DIASTOLIC BLOOD PRESSURE < 80 MM HG: ICD-10-PCS | Mod: CPTII,,, | Performed by: ORTHOPAEDIC SURGERY

## 2022-10-25 PROCEDURE — 3075F PR MOST RECENT SYSTOLIC BLOOD PRESS GE 130-139MM HG: ICD-10-PCS | Mod: CPTII,,, | Performed by: ORTHOPAEDIC SURGERY

## 2022-10-25 PROCEDURE — 71045 X-RAY EXAM CHEST 1 VIEW: CPT | Mod: 26,,, | Performed by: RADIOLOGY

## 2022-10-25 PROCEDURE — 99215 PR OFFICE/OUTPT VISIT, EST, LEVL V, 40-54 MIN: ICD-10-PCS | Mod: S$PBB,,, | Performed by: ORTHOPAEDIC SURGERY

## 2022-10-25 PROCEDURE — 1159F PR MEDICATION LIST DOCUMENTED IN MEDICAL RECORD: ICD-10-PCS | Mod: CPTII,,, | Performed by: ORTHOPAEDIC SURGERY

## 2022-10-25 PROCEDURE — 99215 OFFICE O/P EST HI 40 MIN: CPT | Mod: S$PBB,,, | Performed by: ORTHOPAEDIC SURGERY

## 2022-10-25 PROCEDURE — 1159F MED LIST DOCD IN RCRD: CPT | Mod: CPTII,,, | Performed by: ORTHOPAEDIC SURGERY

## 2022-10-25 PROCEDURE — 71045 X-RAY EXAM CHEST 1 VIEW: CPT | Mod: TC,FY

## 2022-10-25 PROCEDURE — 3078F DIAST BP <80 MM HG: CPT | Mod: CPTII,,, | Performed by: ORTHOPAEDIC SURGERY

## 2022-10-25 NOTE — PROGRESS NOTES
Subjective:      Patient ID: Paulo Anderson is a 57 y.o. male.    Chief Complaint: Pain of the Left Knee    Knee Pain: left  swelling: yes  worsens with activity: yes  relieved by: nsaid's  Some relief w iovera but starting to wear off         Social History     Occupational History    Not on file   Tobacco Use    Smoking status: Never    Smokeless tobacco: Never   Substance and Sexual Activity    Alcohol use: Never    Drug use: Never    Sexual activity: Yes      ROS      Objective:    Ortho/SPM Exam       Assessment:       1. Pain due to total left knee replacement, initial encounter    2. Chronic pain of left knee          Plan:           We had a lengthy conversation about the benefits and risks of peripheral nerve stimulation.  they understands that the 1st phase is a trial where we will place leads and do a test to see if implantation helps improve their pain.  If it does improve the pain symptoms then they will be scheduled for the permanent placement. With respect to the trial they understand that leads will be coming out of the skin and we will activate the leads after they are awake from .  Risks include bleeding, continued pain, damage to the nerves, injury to the bone and soft tissues.  they understand this and feel that the benefits a we the risks.  We will go ahead and schedule this.

## 2022-10-25 NOTE — PROGRESS NOTES
Subjective:      Patient ID: Paulo Anderson is a 57 y.o. male.    Chief Complaint: Pain of the Left Knee    Pain  This is a chronic problem. The current episode started more than 1 year ago. The problem occurs constantly.   Patient has a history of a L TKA with Dr. Meza in July of 2021. He has had hyperesthesia and pain since this time, localized over the anterolateral knee, thigh and lower leg.   Social History     Occupational History    Not on file   Tobacco Use    Smoking status: Never    Smokeless tobacco: Never   Substance and Sexual Activity    Alcohol use: Never    Drug use: Never    Sexual activity: Yes      ROS      Objective:    Ortho/SPM Exam       Assessment:       1. Pain due to total left knee replacement, initial encounter    2. Chronic pain of left knee          Plan:       ***

## 2022-11-07 ENCOUNTER — ANESTHESIA EVENT (OUTPATIENT)
Dept: SURGERY | Facility: HOSPITAL | Age: 57
End: 2022-11-07
Payer: MEDICAID

## 2022-11-07 ENCOUNTER — ANESTHESIA (OUTPATIENT)
Dept: SURGERY | Facility: HOSPITAL | Age: 57
End: 2022-11-07
Payer: MEDICAID

## 2022-11-07 ENCOUNTER — HOSPITAL ENCOUNTER (OUTPATIENT)
Facility: HOSPITAL | Age: 57
Discharge: HOME | End: 2022-11-07
Attending: ORTHOPAEDIC SURGERY | Admitting: ORTHOPAEDIC SURGERY
Payer: MEDICAID

## 2022-11-07 VITALS
BODY MASS INDEX: 29.82 KG/M2 | OXYGEN SATURATION: 98 % | RESPIRATION RATE: 16 BRPM | SYSTOLIC BLOOD PRESSURE: 110 MMHG | TEMPERATURE: 98 F | HEART RATE: 64 BPM | WEIGHT: 190 LBS | DIASTOLIC BLOOD PRESSURE: 72 MMHG | HEIGHT: 67 IN

## 2022-11-07 DIAGNOSIS — M25.569 KNEE PAIN: ICD-10-CM

## 2022-11-07 DIAGNOSIS — T84.89XA POSTOPERATIVE STIFFNESS OF TOTAL KNEE REPLACEMENT, INITIAL ENCOUNTER: Primary | ICD-10-CM

## 2022-11-07 DIAGNOSIS — M25.669 POSTOPERATIVE STIFFNESS OF TOTAL KNEE REPLACEMENT, INITIAL ENCOUNTER: Primary | ICD-10-CM

## 2022-11-07 DIAGNOSIS — Z96.659 POSTOPERATIVE STIFFNESS OF TOTAL KNEE REPLACEMENT, INITIAL ENCOUNTER: Primary | ICD-10-CM

## 2022-11-07 PROCEDURE — 00400 ANES INTEGUMENTARY SYS NOS: CPT | Performed by: ORTHOPAEDIC SURGERY

## 2022-11-07 PROCEDURE — 36000704 HC OR TIME LEV I 1ST 15 MIN: Performed by: ORTHOPAEDIC SURGERY

## 2022-11-07 PROCEDURE — 25000003 PHARM REV CODE 250: Performed by: STUDENT IN AN ORGANIZED HEALTH CARE EDUCATION/TRAINING PROGRAM

## 2022-11-07 PROCEDURE — 63600175 PHARM REV CODE 636 W HCPCS: Performed by: NURSE ANESTHETIST, CERTIFIED REGISTERED

## 2022-11-07 PROCEDURE — 37000009 HC ANESTHESIA EA ADD 15 MINS: Performed by: ORTHOPAEDIC SURGERY

## 2022-11-07 PROCEDURE — D9220A PRA ANESTHESIA: ICD-10-PCS | Mod: CRNA,,, | Performed by: NURSE ANESTHETIST, CERTIFIED REGISTERED

## 2022-11-07 PROCEDURE — 25000003 PHARM REV CODE 250: Performed by: NURSE ANESTHETIST, CERTIFIED REGISTERED

## 2022-11-07 PROCEDURE — D9220A PRA ANESTHESIA: Mod: ANES,,, | Performed by: ANESTHESIOLOGY

## 2022-11-07 PROCEDURE — D9220A PRA ANESTHESIA: Mod: CRNA,,, | Performed by: NURSE ANESTHETIST, CERTIFIED REGISTERED

## 2022-11-07 PROCEDURE — D9220A PRA ANESTHESIA: ICD-10-PCS | Mod: ANES,,, | Performed by: ANESTHESIOLOGY

## 2022-11-07 PROCEDURE — 63600175 PHARM REV CODE 636 W HCPCS: Performed by: STUDENT IN AN ORGANIZED HEALTH CARE EDUCATION/TRAINING PROGRAM

## 2022-11-07 PROCEDURE — 36000705 HC OR TIME LEV I EA ADD 15 MIN: Performed by: ORTHOPAEDIC SURGERY

## 2022-11-07 PROCEDURE — 71000015 HC POSTOP RECOV 1ST HR: Performed by: ORTHOPAEDIC SURGERY

## 2022-11-07 PROCEDURE — 64555 PR PERCUT IMPLANT,NEUROELEC,PERIPH NERVE: ICD-10-PCS | Mod: LT,,, | Performed by: ORTHOPAEDIC SURGERY

## 2022-11-07 PROCEDURE — 37000008 HC ANESTHESIA 1ST 15 MINUTES: Performed by: ORTHOPAEDIC SURGERY

## 2022-11-07 PROCEDURE — 64555 IMPLANT NEUROELECTRODES: CPT | Mod: LT,,, | Performed by: ORTHOPAEDIC SURGERY

## 2022-11-07 RX ORDER — ACETAMINOPHEN 500 MG
1000 TABLET ORAL ONCE
Status: COMPLETED | OUTPATIENT
Start: 2022-11-07 | End: 2022-11-07

## 2022-11-07 RX ORDER — CEFAZOLIN SODIUM 2 G/50ML
2 SOLUTION INTRAVENOUS ONCE
Status: COMPLETED | OUTPATIENT
Start: 2022-11-07 | End: 2022-11-07

## 2022-11-07 RX ORDER — LIDOCAINE HCL/PF 100 MG/5ML
SYRINGE (ML) INTRAVENOUS
Status: DISCONTINUED | OUTPATIENT
Start: 2022-11-07 | End: 2022-11-07

## 2022-11-07 RX ORDER — PHENYLEPHRINE HYDROCHLORIDE 10 MG/ML
INJECTION INTRAVENOUS
Status: DISCONTINUED | OUTPATIENT
Start: 2022-11-07 | End: 2022-11-07

## 2022-11-07 RX ORDER — PROPOFOL 10 MG/ML
VIAL (ML) INTRAVENOUS CONTINUOUS PRN
Status: DISCONTINUED | OUTPATIENT
Start: 2022-11-07 | End: 2022-11-07

## 2022-11-07 RX ORDER — PREGABALIN 75 MG/1
150 CAPSULE ORAL ONCE
Status: COMPLETED | OUTPATIENT
Start: 2022-11-07 | End: 2022-11-07

## 2022-11-07 RX ORDER — CELECOXIB 100 MG/1
400 CAPSULE ORAL ONCE
Status: COMPLETED | OUTPATIENT
Start: 2022-11-07 | End: 2022-11-07

## 2022-11-07 RX ORDER — PROPOFOL 10 MG/ML
VIAL (ML) INTRAVENOUS
Status: DISCONTINUED | OUTPATIENT
Start: 2022-11-07 | End: 2022-11-07

## 2022-11-07 RX ADMIN — LIDOCAINE HYDROCHLORIDE 50 MG: 20 INJECTION, SOLUTION INTRAVENOUS at 12:11

## 2022-11-07 RX ADMIN — CEFAZOLIN SODIUM 2 G: 2 SOLUTION INTRAVENOUS at 12:11

## 2022-11-07 RX ADMIN — PREGABALIN 150 MG: 75 CAPSULE ORAL at 09:11

## 2022-11-07 RX ADMIN — ACETAMINOPHEN 1000 MG: 500 TABLET ORAL at 09:11

## 2022-11-07 RX ADMIN — PROPOFOL 50 MG: 10 INJECTION, EMULSION INTRAVENOUS at 12:11

## 2022-11-07 RX ADMIN — PROPOFOL 40 MG: 10 INJECTION, EMULSION INTRAVENOUS at 12:11

## 2022-11-07 RX ADMIN — SODIUM CHLORIDE, SODIUM LACTATE, POTASSIUM CHLORIDE, AND CALCIUM CHLORIDE: .6; .31; .03; .02 INJECTION, SOLUTION INTRAVENOUS at 12:11

## 2022-11-07 RX ADMIN — CELECOXIB 400 MG: 100 CAPSULE ORAL at 09:11

## 2022-11-07 RX ADMIN — PHENYLEPHRINE HYDROCHLORIDE 100 MCG: 10 INJECTION INTRAVENOUS at 12:11

## 2022-11-07 RX ADMIN — PROPOFOL 100 MCG/KG/MIN: 10 INJECTION, EMULSION INTRAVENOUS at 12:11

## 2022-11-07 NOTE — TRANSFER OF CARE
"Anesthesia Transfer of Care Note    Patient: Paulo Anderson    Procedure(s) Performed: Procedure(s) (LRB):  BLOCK,NERVE,PERIPHERAL (Left)    Patient location: OPS    Anesthesia Type: general    Transport from OR: Transported from OR on room air with adequate spontaneous ventilation    Post pain: adequate analgesia    Post assessment: no apparent anesthetic complications and tolerated procedure well    Post vital signs: stable    Level of consciousness: awake, alert and oriented    Nausea/Vomiting: no nausea/vomiting    Complications: none    Transfer of care protocol was followed      Last vitals:   Visit Vitals  /84 (BP Location: Right arm, Patient Position: Sitting)   Pulse 81   Temp 37 °C (98.6 °F) (Temporal)   Resp 16   Ht 5' 7" (1.702 m)   Wt 86.2 kg (190 lb)   SpO2 99%   BMI 29.76 kg/m²     "

## 2022-11-07 NOTE — ANESTHESIA PREPROCEDURE EVALUATION
11/07/2022  Paulo Anderson is a 57 y.o., male presents for peripheral block under MAC.    History reviewed. No pertinent past medical history.  Past Surgical History:   Procedure Laterality Date    KNEE SURGERY  07/2021           Pre-op Assessment    I have reviewed the Patient Summary Reports.     I have reviewed the Nursing Notes. I have reviewed the NPO Status.   I have reviewed the Medications.     Review of Systems  Anesthesia Hx:  No problems with previous Anesthesia    Cardiovascular:  Cardiovascular Normal     Pulmonary:  Pulmonary Normal    Hepatic/GI:  Hepatic/GI Normal    Endocrine:  Endocrine Normal        Physical Exam  General: Well nourished, Cooperative, Alert and Oriented    Airway:  Mallampati: II   Mouth Opening: Normal  TM Distance: Normal  Tongue: Normal    Chest/Lungs:  Clear to auscultation, Normal Respiratory Rate    Heart:  Rate: Normal  Rhythm: Regular Rhythm  Sounds: Normal        Anesthesia Plan  Type of Anesthesia, risks & benefits discussed:    Anesthesia Type: MAC  Intra-op Monitoring Plan: Standard ASA Monitors  Post Op Pain Control Plan: multimodal analgesia  Induction:  IV  Airway Plan: Direct  ASA Score: 2    Ready For Surgery From Anesthesia Perspective.     .

## 2022-11-07 NOTE — ANESTHESIA POSTPROCEDURE EVALUATION
Anesthesia Post Evaluation    Patient: Paulo Anderson    Procedure(s) Performed: Procedure(s) (LRB):  BLOCK,NERVE,PERIPHERAL (Left)    Final Anesthesia Type: general      Patient location during evaluation: OPS  Patient participation: Yes- Able to Participate  Level of consciousness: awake and alert and oriented  Post-procedure vital signs: reviewed and stable  Pain management: adequate  Airway patency: patent    PONV status at discharge: No PONV  Anesthetic complications: no      Cardiovascular status: hemodynamically stable  Respiratory status: room air, spontaneous ventilation and unassisted  Hydration status: euvolemic  Follow-up not needed.          Vitals Value Taken Time   /75 11/07/22 1310   Temp 97.5 11/07/22 1310   Pulse 82 11/07/22 1310   Resp 20 11/07/22 1310   SpO2 98 11/07/22 1310         No case tracking events are documented in the log.      Pain/Daiana Score: Pain Rating Prior to Med Admin: 0 (11/7/2022  9:38 AM)

## 2022-11-07 NOTE — BRIEF OP NOTE
Orthopedic Surgery Brief Op Discharge    Procedure: painful left total knee arthroplasty    Pre-op diagnosis: painful left total knee arthroplasty    Postop diagnosis: same    Surgeon: MD Jean Newton MD    Blood loss: <50cc    Fluids: see anesthesia documentation    Anesthesia: MAC    Complications: none      Patient presented to [facility] on day of scheduled surgery and underwent trial left knee nerve stimulator placement, please see operative report for further detail. Patient did well postoperatively and was found to be fit for discharge on POD# 0.  Their pain was controlled.  The patient met all discharge criteria.  The patient was discharged home in stable condition. They were given a follow-up appointment in 2 weeks in clinic for a wound check and range of motion check.  All questions and concerns of the patient were answered to their satisfaction.    Condition: Patient tolerated procedure well, was extubated, and returned to the recovery unit in stable condition.     Post Op Total Knee Arthroplasty Instructions     Discharge from PACU when stable  Return to clinic in 2 weeks for recheck, possible surgical discussion re: stimulator  3. You may resume all pre-surgery medications unless otherwise indicated  4. Tylenol and aleve or ibuprofen as directed on bottle for pain      Please call our team with questions or concerns    Martin Lentz MD  [facility]

## 2022-11-07 NOTE — OP NOTE
11/7/22    Pre op dx:  left knee pain     Post op dx:  Same     Procedure:  Percutaneous implantation of neurostimulator electrode array; peripheral nerve:  Superior lateral geniculate nerve     Percutaneous implantation of neurostimulator electrode array; peripheral nerve:  Superomedial geniculate nerve     Percutaneous implantation of neurostimulator electrode array; peripheral nerve:  Inferomedial geniculate nerve     Fluoroscopic guidance       Attending Surgeon: Ryley Raygoza MD     Assistants:      Complications: none     Estimated bood loss: < 20cc     Implants:  3 trial leads with nerve stimulator attachment point     Indication:  Patient has had significant knee pain.  They have failed various non surgical treatments including medications.      Findings: bony anatomy was intact. No significant abnormalities were found.      Procedure:  Patient is brought to operating room placed supine operative table conscious sedation was induced without any difficulties.  The left knee was then prepped draped in sterile fashion.  Prior to beginning the procedure proper site and procedure were verified.  Under fluoroscopy we infiltrated skin and subcutaneous tissue with Marcaine along the anticipated path of the stimulator needle.  Using the 1st stimulator needle we came along the midline of the lateral aspect of the femur under fluoroscopy and punctured the skin down to lateral femoral cortex.  We verified location of the introducer both on the AP and lateral x-rays.  The wire was placed at the flare at the mid point of the AP distance along the periosteum .  The internal trocar was then removed and the flexible wire placed through the introducer and secured to the skin using Tegaderm.  Next we went to the medial side of the knee injected Marcaine along the tract.  We then introduced the needle and introducer again along the posterior 3rd cortex of the femur at the level of the flare.  This was verified using fluoroscopy  ITP ASSESSMENT   Assessment Day: 90 Day    Session Number: 16  Precautions: Falls    Diagnosis: Stent    Risk Stratification: Medium    Referring Provider: Reece Weir MD  EXERCISE  Exercise Assessment: Reassessment                         Exercise Plan  Goals Next 30 days  ADL'S: Use TM 3x/week for 15 minutes    Leisure: Meet with dietician    Education Goals: All goals in this section met    Education Goals Met: Patient can state cardiac s/s and appropriate emergency response.;Has system for taking medication.;Medication review.                        Goals Met  60 day ADL'S goals met: Has used TM 2x/week    60 Day Progression: Pt hurt his knee, limited TM use.    Initial ADL's goals met: Not met. Due to L/E surgery he was unable to do yardwork.    Initial Leisure goals met: Not met, he is just starting to walk on TM.    Intial Work goals met: Met. 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    Initial Progression: Doing well enough in rehab. Very little tolerance to increase workloads.    Exercise Prescription  Exercise Mode: Treadmill;Bike;Nustep;Arm Erg.    Frequency: 2x/week    Duration: 40 minutes    Intensity / THR: 20-30 beats above resting heart rate    RPE 11-14  Progression / Met level: 3.5-3.8    Resistive Training?: Yes    Current Exercise (mins/week): 120    Interventions  Home Exercise:  Mode: Walking/TM    Frequency: Daily    Duration: 20-30 minutes    Education Material : Educational videos;Provide written material;Individual education and counseling;Offer educational classes    Education Completed  Exercise Education Completed: Cardiac Anatomy;Signs and Symptoms;Medication review;RPE;Emergency Plan;Home Exercise;Warm up/cool down;FITT Principles;BP/HR Reponse to exercise;Benefits of Exercise;End point of exercise            Exercise Follow-up/Discharge  Follow up/Discharge: Encouraged pt to continue with home exercise   NUTRITION  Nutrition Assessment: Reassessment    Nutrition Risk  "Factors:  Nutrition Risk Factors: Diabetes;Dyslipidemia  HbA1c: 6.1  Monitors blood sugar at home: No  Cholesterol: 135  LDL: 61  HDL: 45  Triglycerides: 145    Nutrition Plan  Interventions  Other Nutrition Intervention: Diet Class;Therapist/Pt Discussion;Educational Videos;Provide with Written Material    Education Completed  Nutrition Education Completed: Low Saturated fat diet;Risk factor overview    Goals  Nutrition Goals (Next 30 days): Patient will follow a low sodium diet;Patient will follow a low saturated fat diet;Patient able to demonstrate carbohydrate counting;Patient can identify their risk factors for CAD;Patient knows appropriate portion size    Goals Met  Nutrition Goals Met: Completed Nutritional Risk Screen;Provided Rate your Plate Survey;Reviewed Dietitian schedule    Height, Weight, and  BMI  Weight: 168 lb (76.2 kg)  Height: 5' 6\" (1.676 m)  BMI: 27.13    Nutrition Follow-up  Follow-up/Discharge: Will encourage pt to meet with dietician         Other Risk Factors  Other Risk Factor Assessment: Reassessment    HTN Risk Factor: Hypertension    Pre Exercise BP: 122/74  Post Exercise BP: 116/54    Hypertension Plan  Goals  HTN Goals: Follow low sodium diet;Take medication as prescribed;Exercises regularly    Goals Met  HTN Goals Met: Take medication as prescribed    HTN Interventions  HTN Interventions: Diet consult;Therapist/patient discussion;Provide written material;Offer educational videos;Offer educational classes    HTN Education Completed  HTN Education Completed: Medication review;Risk factor overview      Tobacco Risk Factor: NA    Risk Factor Follow-up   Follow-up/Discharge: Discussed risk factors and risk factor modificaiton   PSYCHOSOCIAL  Psychosocial Assessment: Reassessment    [facility] LUCY Q of L Summary Score: 22    SHELBY-D Score: 0    Psychosocial Risk Factor: Stress    Psychosocial Plan  Interventions  Interventions: Offer Spiritual Care consult;Offer educational videos and " both AP and lateral.  The needle trocar was then removed and the flexible wire placed through the cannula and locked into place on the lower lock and then secured to the skin using Tegaderm.  We then turned our attention to the proximal tibia.  In an anterior to posterior direction we placed the needle along the proximal tibia aiming to be at the midway point in AP direction.  This was done at the tibial flare.  Location was verified on fluoro both on the AP and lateral x-rays.  We then removed the needle stylus and placed the flexible wire and lower locked it into place.  We then used Tegaderm and anchored the wire to the skin.  All wires were then tested and a signal was achieved.  We then wrapped all the wires in place using an Ace wrap and patient was then transferred to recovery room in stable condition.     Pre operative visual analog score:  10/10  Post operative visual analog score after stimulation of all 3 deep geniculate nerves: 4/10     Superior lateral: 100 phase, 1.6 mA @ 100Hz  Superior medical: 200 phase 5.1 mA @ 100 Hz  Inferior medial: 200 phase 5.0 mA @ 100 Hz    classes;Provide written material;Individual education and counseling    Education Completed  Education Completed: Relaxation/Coping Techniques;S/S of depression;Effects of stress on body    Goals  Goals (Next 30 days): Identify stressors;Practicing stress management skills    Goals Met  Goals Met: Identified Support system;Oriented to stress management classes    Psychosocial Follow-up  Follow-up/Discharge: Pt stated minimal Stress        Patient involved in Goal setting?: Yes      Signature: _____________________________________________________________    Date: __________________    Time: __________________

## 2022-11-22 ENCOUNTER — OFFICE VISIT (OUTPATIENT)
Dept: ORTHOPEDICS | Facility: CLINIC | Age: 57
End: 2022-11-22
Payer: MEDICAID

## 2022-11-22 VITALS
SYSTOLIC BLOOD PRESSURE: 138 MMHG | DIASTOLIC BLOOD PRESSURE: 91 MMHG | HEART RATE: 78 BPM | BODY MASS INDEX: 28.86 KG/M2 | HEIGHT: 67 IN | WEIGHT: 183.88 LBS

## 2022-11-22 DIAGNOSIS — T84.89XA POSTOPERATIVE STIFFNESS OF TOTAL KNEE REPLACEMENT, INITIAL ENCOUNTER: ICD-10-CM

## 2022-11-22 DIAGNOSIS — Z96.652 PAIN DUE TO TOTAL LEFT KNEE REPLACEMENT, INITIAL ENCOUNTER: ICD-10-CM

## 2022-11-22 DIAGNOSIS — T84.84XA PAIN DUE TO TOTAL LEFT KNEE REPLACEMENT, INITIAL ENCOUNTER: ICD-10-CM

## 2022-11-22 DIAGNOSIS — M25.669 POSTOPERATIVE STIFFNESS OF TOTAL KNEE REPLACEMENT, INITIAL ENCOUNTER: ICD-10-CM

## 2022-11-22 DIAGNOSIS — M25.562 CHRONIC PAIN OF LEFT KNEE: Primary | ICD-10-CM

## 2022-11-22 DIAGNOSIS — Z96.659 POSTOPERATIVE STIFFNESS OF TOTAL KNEE REPLACEMENT, INITIAL ENCOUNTER: ICD-10-CM

## 2022-11-22 DIAGNOSIS — G89.29 CHRONIC PAIN OF LEFT KNEE: Primary | ICD-10-CM

## 2022-11-22 PROCEDURE — 99215 PR OFFICE/OUTPT VISIT, EST, LEVL V, 40-54 MIN: ICD-10-PCS | Mod: S$PBB,,, | Performed by: ORTHOPAEDIC SURGERY

## 2022-11-22 PROCEDURE — 3075F SYST BP GE 130 - 139MM HG: CPT | Mod: CPTII,,, | Performed by: ORTHOPAEDIC SURGERY

## 2022-11-22 PROCEDURE — 3075F PR MOST RECENT SYSTOLIC BLOOD PRESS GE 130-139MM HG: ICD-10-PCS | Mod: CPTII,,, | Performed by: ORTHOPAEDIC SURGERY

## 2022-11-22 PROCEDURE — 3080F DIAST BP >= 90 MM HG: CPT | Mod: CPTII,,, | Performed by: ORTHOPAEDIC SURGERY

## 2022-11-22 PROCEDURE — 3008F BODY MASS INDEX DOCD: CPT | Mod: CPTII,,, | Performed by: ORTHOPAEDIC SURGERY

## 2022-11-22 PROCEDURE — 99215 OFFICE O/P EST HI 40 MIN: CPT | Mod: S$PBB,,, | Performed by: ORTHOPAEDIC SURGERY

## 2022-11-22 PROCEDURE — 1159F MED LIST DOCD IN RCRD: CPT | Mod: CPTII,,, | Performed by: ORTHOPAEDIC SURGERY

## 2022-11-22 PROCEDURE — 99999 PR PBB SHADOW E&M-EST. PATIENT-LVL V: CPT | Mod: PBBFAC,,, | Performed by: ORTHOPAEDIC SURGERY

## 2022-11-22 PROCEDURE — 3080F PR MOST RECENT DIASTOLIC BLOOD PRESSURE >= 90 MM HG: ICD-10-PCS | Mod: CPTII,,, | Performed by: ORTHOPAEDIC SURGERY

## 2022-11-22 PROCEDURE — 1159F PR MEDICATION LIST DOCUMENTED IN MEDICAL RECORD: ICD-10-PCS | Mod: CPTII,,, | Performed by: ORTHOPAEDIC SURGERY

## 2022-11-22 PROCEDURE — 99999 PR PBB SHADOW E&M-EST. PATIENT-LVL V: ICD-10-PCS | Mod: PBBFAC,,, | Performed by: ORTHOPAEDIC SURGERY

## 2022-11-22 PROCEDURE — 3008F PR BODY MASS INDEX (BMI) DOCUMENTED: ICD-10-PCS | Mod: CPTII,,, | Performed by: ORTHOPAEDIC SURGERY

## 2022-11-22 PROCEDURE — 99215 OFFICE O/P EST HI 40 MIN: CPT | Mod: PBBFAC,PN | Performed by: ORTHOPAEDIC SURGERY

## 2022-11-22 NOTE — PROGRESS NOTES
Subjective:      Patient ID: Paulo Anderson is a 57 y.o. male.    Chief Complaint: Post-op Evaluation of the Left Knee and Post-op Evaluation    Patient reports more than 50% pain relief during the trial phase immediately after temporary lead placements in the hospital.  they state that pain relief continued even for about 2 d or so after the procedure and the leads removed.  they would like to move forward with the permanent peripheral nerve stimulator and is excited for the pain relief if it is similar to what they felt during the trial.    Social History     Occupational History    Not on file   Tobacco Use    Smoking status: Never    Smokeless tobacco: Never   Substance and Sexual Activity    Alcohol use: Never    Drug use: Never    Sexual activity: Yes      ROS      Objective:    Ortho/SPM Exam       Assessment:       1. Chronic pain of left knee    2. Postoperative stiffness of total knee replacement, initial encounter    3. Pain due to total left knee replacement, initial encounter          Plan:       We had a lengthy discussion about the permanent peripheral nerve stimulator procedure.  We reviewed the risks and benefits of the procedure which include continued pain, damage to nerves, infection among other risks and complications. they feel that the benefits outweigh the risks and has a significant amount of pain hindering quality of life.  they would like to move forward with the definitive permanent peripheral nerve stimulator.

## 2023-04-11 ENCOUNTER — PATIENT MESSAGE (OUTPATIENT)
Dept: RESEARCH | Facility: HOSPITAL | Age: 58
End: 2023-04-11
Payer: MEDICAID

## 2023-04-25 ENCOUNTER — PATIENT MESSAGE (OUTPATIENT)
Dept: RESEARCH | Facility: HOSPITAL | Age: 58
End: 2023-04-25
Payer: MEDICAID

## 2023-05-02 ENCOUNTER — PATIENT MESSAGE (OUTPATIENT)
Dept: RESEARCH | Facility: HOSPITAL | Age: 58
End: 2023-05-02
Payer: MEDICAID

## 2023-05-16 ENCOUNTER — PATIENT MESSAGE (OUTPATIENT)
Dept: ORTHOPEDICS | Facility: CLINIC | Age: 58
End: 2023-05-16
Payer: MEDICAID

## 2023-06-05 ENCOUNTER — PATIENT MESSAGE (OUTPATIENT)
Dept: ORTHOPEDICS | Facility: CLINIC | Age: 58
End: 2023-06-05
Payer: MEDICAID

## 2023-06-27 ENCOUNTER — OFFICE VISIT (OUTPATIENT)
Dept: ORTHOPEDICS | Facility: CLINIC | Age: 58
End: 2023-06-27
Payer: MEDICAID

## 2023-06-27 VITALS
BODY MASS INDEX: 28.23 KG/M2 | HEART RATE: 79 BPM | WEIGHT: 179.88 LBS | DIASTOLIC BLOOD PRESSURE: 83 MMHG | HEIGHT: 67 IN | SYSTOLIC BLOOD PRESSURE: 150 MMHG

## 2023-06-27 DIAGNOSIS — T84.84XD PAIN DUE TO TOTAL LEFT KNEE REPLACEMENT, SUBSEQUENT ENCOUNTER: Primary | ICD-10-CM

## 2023-06-27 DIAGNOSIS — Z96.652 PAIN DUE TO TOTAL LEFT KNEE REPLACEMENT, SUBSEQUENT ENCOUNTER: Primary | ICD-10-CM

## 2023-06-27 PROCEDURE — 3079F PR MOST RECENT DIASTOLIC BLOOD PRESSURE 80-89 MM HG: ICD-10-PCS | Mod: CPTII,,, | Performed by: ORTHOPAEDIC SURGERY

## 2023-06-27 PROCEDURE — 1159F PR MEDICATION LIST DOCUMENTED IN MEDICAL RECORD: ICD-10-PCS | Mod: CPTII,,, | Performed by: ORTHOPAEDIC SURGERY

## 2023-06-27 PROCEDURE — 3077F SYST BP >= 140 MM HG: CPT | Mod: CPTII,,, | Performed by: ORTHOPAEDIC SURGERY

## 2023-06-27 PROCEDURE — 4010F ACE/ARB THERAPY RXD/TAKEN: CPT | Mod: CPTII,,, | Performed by: ORTHOPAEDIC SURGERY

## 2023-06-27 PROCEDURE — 99213 OFFICE O/P EST LOW 20 MIN: CPT | Mod: PBBFAC,PN | Performed by: ORTHOPAEDIC SURGERY

## 2023-06-27 PROCEDURE — 99999 PR PBB SHADOW E&M-EST. PATIENT-LVL III: ICD-10-PCS | Mod: PBBFAC,,, | Performed by: ORTHOPAEDIC SURGERY

## 2023-06-27 PROCEDURE — 99999 PR PBB SHADOW E&M-EST. PATIENT-LVL III: CPT | Mod: PBBFAC,,, | Performed by: ORTHOPAEDIC SURGERY

## 2023-06-27 PROCEDURE — 3008F PR BODY MASS INDEX (BMI) DOCUMENTED: ICD-10-PCS | Mod: CPTII,,, | Performed by: ORTHOPAEDIC SURGERY

## 2023-06-27 PROCEDURE — 3079F DIAST BP 80-89 MM HG: CPT | Mod: CPTII,,, | Performed by: ORTHOPAEDIC SURGERY

## 2023-06-27 PROCEDURE — 4010F PR ACE/ARB THEARPY RXD/TAKEN: ICD-10-PCS | Mod: CPTII,,, | Performed by: ORTHOPAEDIC SURGERY

## 2023-06-27 PROCEDURE — 3077F PR MOST RECENT SYSTOLIC BLOOD PRESSURE >= 140 MM HG: ICD-10-PCS | Mod: CPTII,,, | Performed by: ORTHOPAEDIC SURGERY

## 2023-06-27 PROCEDURE — 1159F MED LIST DOCD IN RCRD: CPT | Mod: CPTII,,, | Performed by: ORTHOPAEDIC SURGERY

## 2023-06-27 PROCEDURE — 99213 PR OFFICE/OUTPT VISIT, EST, LEVL III, 20-29 MIN: ICD-10-PCS | Mod: S$PBB,,, | Performed by: ORTHOPAEDIC SURGERY

## 2023-06-27 PROCEDURE — 3008F BODY MASS INDEX DOCD: CPT | Mod: CPTII,,, | Performed by: ORTHOPAEDIC SURGERY

## 2023-06-27 PROCEDURE — 99213 OFFICE O/P EST LOW 20 MIN: CPT | Mod: S$PBB,,, | Performed by: ORTHOPAEDIC SURGERY

## 2023-06-27 RX ORDER — GABAPENTIN 300 MG/1
600 CAPSULE ORAL 2 TIMES DAILY
Qty: 56 CAPSULE | Refills: 0 | Status: SHIPPED | OUTPATIENT
Start: 2023-06-27 | End: 2023-10-31 | Stop reason: SDUPTHER

## 2023-06-27 NOTE — PROGRESS NOTES
Subjective:      Patient ID: Paulo Anderson is a 57 y.o. male.    Chief Complaint: Pain of the Left Knee    Patient reports more than 50% pain relief during the trial phase immediately after temporary lead placements in the hospital.  they state that pain relief continued even for about 2 d or so after the procedure and the leads removed.  We had scheduled the permanent stem router placement for this past January however he unfortunately had multiple deaths in the family and had to postpone the surgery.  He is now nervous about the procedure but optimistic that it will help him.  No new changes in his left knee pain.    Pain    Social History     Occupational History    Not on file   Tobacco Use    Smoking status: Never    Smokeless tobacco: Never   Substance and Sexual Activity    Alcohol use: Never    Drug use: Never    Sexual activity: Yes      ROS      Objective:    Ortho/SPM Exam   Anterior left knee incision well healed.  Mild joint effusion.  Tender to palpation along the lateral aspect of the joint line.  Able to fully flex and extend the knee.  Neurovascular intact distally.      Assessment:       1. Pain due to total left knee replacement, subsequent encounter          Plan:       We had a lengthy discussion about the permanent peripheral nerve stimulator procedure.  We reviewed the risks and benefits of the procedure which include continued pain, damage to nerves, infection among other risks and complications. they feel that the benefits outweigh the risks and has a significant amount of pain hindering quality of life.  He will call and schedule an appointment when he would like to move forward with the permanent stem router placement.

## 2023-07-24 ENCOUNTER — PATIENT MESSAGE (OUTPATIENT)
Dept: RESEARCH | Facility: HOSPITAL | Age: 58
End: 2023-07-24
Payer: MEDICAID

## 2023-07-31 ENCOUNTER — PATIENT MESSAGE (OUTPATIENT)
Dept: RESEARCH | Facility: HOSPITAL | Age: 58
End: 2023-07-31
Payer: MEDICAID

## 2023-11-01 RX ORDER — GABAPENTIN 300 MG/1
600 CAPSULE ORAL 2 TIMES DAILY
Qty: 56 CAPSULE | Refills: 0 | Status: SHIPPED | OUTPATIENT
Start: 2023-11-01 | End: 2024-01-03 | Stop reason: SDUPTHER

## 2024-01-04 RX ORDER — GABAPENTIN 300 MG/1
600 CAPSULE ORAL 2 TIMES DAILY
Qty: 56 CAPSULE | Refills: 0 | Status: SHIPPED | OUTPATIENT
Start: 2024-01-04 | End: 2024-01-31 | Stop reason: SDUPTHER

## 2024-01-31 RX ORDER — GABAPENTIN 300 MG/1
600 CAPSULE ORAL 2 TIMES DAILY
Qty: 56 CAPSULE | Refills: 0 | Status: SHIPPED | OUTPATIENT
Start: 2024-01-31 | End: 2024-02-14

## 2024-07-08 ENCOUNTER — CLINICAL SUPPORT (OUTPATIENT)
Dept: AUDIOLOGY | Facility: CLINIC | Age: 59
End: 2024-07-08
Payer: MEDICAID

## 2024-07-08 DIAGNOSIS — H93.292 ABNORMAL AUDITORY PERCEPTION OF LEFT EAR: Primary | ICD-10-CM

## 2024-07-08 NOTE — PROGRESS NOTES
Mr. Paulo Anderson was scheduled for an audiological evaluation today.  Otoscopy revealed occluding cerumen for the left ear.  Tympanometry testing revealed a Type C tympanogram for the right ear and a Type B (normal ear canal volume) for the left ear.  Audiological testing deferred today due to cerumen impaction of the left ear.  Mr. Anderson is scheduled to see Dr. Martínez in November and will have his hearing tested following cerumen removal.  He stated he may also try OTC removal drops and reschedule his audio before his appt with Dr. Martínez if the OTC drops seem to help. Mr. Anderson and his wife verbalized understanding to all topics discussed today.

## 2024-11-13 ENCOUNTER — OFFICE VISIT (OUTPATIENT)
Dept: OTOLARYNGOLOGY | Facility: CLINIC | Age: 59
End: 2024-11-13
Payer: MEDICAID

## 2024-11-13 ENCOUNTER — CLINICAL SUPPORT (OUTPATIENT)
Dept: AUDIOLOGY | Facility: CLINIC | Age: 59
End: 2024-11-13
Payer: MEDICAID

## 2024-11-13 VITALS — DIASTOLIC BLOOD PRESSURE: 77 MMHG | SYSTOLIC BLOOD PRESSURE: 117 MMHG | HEART RATE: 68 BPM

## 2024-11-13 DIAGNOSIS — R09.81 NASAL CONGESTION: ICD-10-CM

## 2024-11-13 DIAGNOSIS — H90.A32 MIXED CONDUCTIVE AND SENSORINEURAL HEARING LOSS OF LEFT EAR WITH RESTRICTED HEARING OF RIGHT EAR: Primary | ICD-10-CM

## 2024-11-13 DIAGNOSIS — H61.21 RIGHT EAR IMPACTED CERUMEN: ICD-10-CM

## 2024-11-13 DIAGNOSIS — H69.92 ETD (EUSTACHIAN TUBE DYSFUNCTION), LEFT: Primary | ICD-10-CM

## 2024-11-13 DIAGNOSIS — J00 NASOPHARYNGITIS ACUTE: ICD-10-CM

## 2024-11-13 DIAGNOSIS — H90.A32 MIXED CONDUCTIVE AND SENSORINEURAL HEARING LOSS OF LEFT EAR WITH RESTRICTED HEARING OF RIGHT EAR: ICD-10-CM

## 2024-11-13 DIAGNOSIS — T16.2XXA ACUTE FOREIGN BODY OF EAR CANAL, LEFT, INITIAL ENCOUNTER: ICD-10-CM

## 2024-11-13 DIAGNOSIS — H65.92 LEFT SEROUS OTITIS MEDIA, UNSPECIFIED CHRONICITY: ICD-10-CM

## 2024-11-13 PROCEDURE — 69200 CLEAR OUTER EAR CANAL: CPT | Mod: PBBFAC | Performed by: OTOLARYNGOLOGY

## 2024-11-13 PROCEDURE — 4010F ACE/ARB THERAPY RXD/TAKEN: CPT | Mod: CPTII,,, | Performed by: OTOLARYNGOLOGY

## 2024-11-13 PROCEDURE — 99999 PR PBB SHADOW E&M-EST. PATIENT-LVL IV: CPT | Mod: PBBFAC,,, | Performed by: OTOLARYNGOLOGY

## 2024-11-13 PROCEDURE — 99214 OFFICE O/P EST MOD 30 MIN: CPT | Mod: PBBFAC,27,25 | Performed by: OTOLARYNGOLOGY

## 2024-11-13 PROCEDURE — 99999 PR PBB SHADOW E&M-EST. PATIENT-LVL I: CPT | Mod: PBBFAC,,,

## 2024-11-13 PROCEDURE — 69210 REMOVE IMPACTED EAR WAX UNI: CPT | Mod: 59,S$PBB,, | Performed by: OTOLARYNGOLOGY

## 2024-11-13 PROCEDURE — 92511 NASOPHARYNGOSCOPY: CPT | Mod: 51,S$PBB,, | Performed by: OTOLARYNGOLOGY

## 2024-11-13 PROCEDURE — 69200 CLEAR OUTER EAR CANAL: CPT | Mod: S$PBB,LT,, | Performed by: OTOLARYNGOLOGY

## 2024-11-13 PROCEDURE — 1160F RVW MEDS BY RX/DR IN RCRD: CPT | Mod: CPTII,,, | Performed by: OTOLARYNGOLOGY

## 2024-11-13 PROCEDURE — 92567 TYMPANOMETRY: CPT | Mod: PBBFAC

## 2024-11-13 PROCEDURE — 99211 OFF/OP EST MAY X REQ PHY/QHP: CPT | Mod: PBBFAC

## 2024-11-13 PROCEDURE — 92557 COMPREHENSIVE HEARING TEST: CPT | Mod: PBBFAC

## 2024-11-13 PROCEDURE — 3074F SYST BP LT 130 MM HG: CPT | Mod: CPTII,,, | Performed by: OTOLARYNGOLOGY

## 2024-11-13 PROCEDURE — 1159F MED LIST DOCD IN RCRD: CPT | Mod: CPTII,,, | Performed by: OTOLARYNGOLOGY

## 2024-11-13 PROCEDURE — 69210 REMOVE IMPACTED EAR WAX UNI: CPT | Mod: PBBFAC | Performed by: OTOLARYNGOLOGY

## 2024-11-13 PROCEDURE — 92511 NASOPHARYNGOSCOPY: CPT | Mod: PBBFAC | Performed by: OTOLARYNGOLOGY

## 2024-11-13 PROCEDURE — 99204 OFFICE O/P NEW MOD 45 MIN: CPT | Mod: S$PBB,,, | Performed by: OTOLARYNGOLOGY

## 2024-11-13 PROCEDURE — 3078F DIAST BP <80 MM HG: CPT | Mod: CPTII,,, | Performed by: OTOLARYNGOLOGY

## 2024-11-13 RX ORDER — AMOXICILLIN AND CLAVULANATE POTASSIUM 875; 125 MG/1; MG/1
1 TABLET, FILM COATED ORAL 2 TIMES DAILY
Qty: 20 TABLET | Refills: 0 | Status: SHIPPED | OUTPATIENT
Start: 2024-11-13 | End: 2024-11-23

## 2024-11-13 RX ORDER — DUPILUMAB 300 MG/2ML
INJECTION, SOLUTION SUBCUTANEOUS
COMMUNITY
Start: 2024-11-06

## 2024-11-13 RX ORDER — FLUTICASONE PROPIONATE 50 MCG
2 SPRAY, SUSPENSION (ML) NASAL DAILY
Qty: 16 G | Refills: 2 | Status: SHIPPED | OUTPATIENT
Start: 2024-11-13 | End: 2025-05-12

## 2024-11-13 NOTE — PROGRESS NOTES
59-year-old male who presents with his wife for evaluation of trouble with his left ear and hearing.  Patient reports noticing some hearing loss.  He has probably had trouble for about a year.  He was told there was a lot of wax in the left ear.  He tried to use Debrox numerous times but still feels like there is an issue.  The amount of hearing loss fluctuates.  His right ear feels stronger than the left.  At times he feels fluid in the left ear.  He tries not to get a lot of fluid in the ear when he showers.  He sometimes will in his able to turn his head and get it out but it still does not feel like the hearing is improved.  He denies having trouble with his hearing as a kid.  He had no trouble with ear infections.  No prior tympanostomy tubes.  He did start Dupixent for his psoriasis about a year ago. Water sloshing around in left ear.    No other otologic symptoms. Some congestion at times with exposure. Works in [employer]/ [employer]. Does not always wear hearing protection b/c of pressure the ear muffs apply. No other family hx of HL. No smoking hx.    Has had some back and knee issues this past year.    PMHx, PSHx, Meds, Allergies, SocHx, FamHx reviewed in EPIC    Review of Systems     Constitutional: Negative for appetite change, chills, fatigue, fever and unexpected weight loss.      HENT: Positive for ear pain and hearing loss.  Negative for ear discharge, postnasal drip, runny nose, sinus infection, sinus pressure, stuffy nose, trouble swallowing and voice change.      Eyes:  Positive for photophobia. Negative for eye drainage and eye itching.     Respiratory:  Negative for cough, shortness of breath, sleep apnea, snoring and wheezing.      Cardiovascular:  Negative for chest pain, foot swelling, irregular heartbeat and swollen veins.     Gastrointestinal:  Positive for acid reflux. Negative for abdominal pain and heartburn.     Genitourinary: Negative for difficulty urinating, sexual problems and  frequent urination.     Musc: Negative for aching joints, aching muscles, back pain and neck pain.     Skin: Negative for rash.     Allergy: Negative for food allergies and seasonal allergies.     Endocrine: Negative for cold intolerance and heat intolerance.      Neurological: Negative for dizziness, headaches, light-headedness, seizures and tremors.      Hematologic: Negative for bruises/bleeds easily and swollen glands.      Psychiatric: Negative for decreased concentration, depression, nervous/anxious and sleep disturbance.        PE: /77 (BP Location: Left arm, Patient Position: Sitting)   Pulse 68    Gen: male, well nourished, well developed, NAD, cooperative, good historian. Hear with his wife.  Ears:  EAC with cerumen adjacent to TM on right anterior medial EAC; EAC on left with piece of a leaf (ears cleared - see below) & TM translucent with normal bony landmarks on right; TM on left with davi JOSE (patient cannot insufflate even after decongestant)  Nose: external nose wnl, nasal septum some fluctuation, inferior turbinates mild edema, no visible purulence or polyps  OC/OP:  MMM, tongue protrudes midline, palate raises symmetrically, tonsils small and symmetric, no erythema or exudate  Neck: supple, no TTP, no LAD or masses  Face:  no TTP, no erythema or flushing  Respiratory: Breathing comfortably without retractions  Skin: facial skin intact without visible lesions or flushing  Lymph: no neck lympadenopathy  Neuro:  facial movement symmetric, speech fluid, gait stable, tongue protrudes midline  Psych: alert & oriented x 3, reasonable, normal affect    After patient left - this audiologic info was obtained              Procedure: Removal of cerumen impaction using microsurgical instrumentation.  After explaining the procedure and obtaining verbal assent, the patient was comfortably positioned and each external auditory canal visualized with magnification. The obstructing cerumen was removed with  microsurgical instrumentation, using a suction cannula to reveal patent external auditory canals.  Right canal(s) cleared. The patient tolerated this procedure well without complication.    Procedure: Removal of foreign body using microsurgical instrumentation.  After explaining the procedure and obtaining verbal assent, the patient was comfortably positioned and each external auditory canal visualized with magnification. The foreign body was removed with microsurgical instrumentation, using a suction cannula to reveal patent external auditory canals.  Left canal(s) cleared. The patient tolerated this procedure well without complication.    In order to fully examine the upper aerodigestive tract, including the nasal cavity, in a patient with a persistent unilateral JOSE, examination of nasopharynx is essential, flexible endoscopy is needed.  After explaining the procedure and obtaining verbal consent, a timeout was performed with the patient's participation according to the universal protocol. Both nasal cavities were anesthetized with 4% Xylocaine spray and Neel-Synephrine. The flexible laryngoscope (#[id number]) was inserted into the nasal cavity and advanced to visualize the nasal cavity and nasopharynx, with the findings noted. The scope was removed and the procedure terminated. The patient tolerated this procedure well without apparent complication.      FINDINGS  Nasal cavity - smooth floor bilaterally. No masses or bulges.   Nasal septum - some fluctuation mild  Nasopharynx - posterior nasal wall with slightly nodular adenoid tissue - mild hypertrophy. Adenoidal tissue extends to medial rim of torus at ET orifice w/o any bulge or filling of Rosenmueller's fossa. Side compared - slightly more nodular adenoid tissue to left NP than to right NP.     Left nasal cavity      Central nasopharynx        Left nasal pharynx        RIGHT nasal pharynx          Impression:   1. ETD (Eustachian tube dysfunction), left   fluticasone propionate (FLONASE) 50 mcg/actuation nasal spray      2. Nasal congestion  fluticasone propionate (FLONASE) 50 mcg/actuation nasal spray      3. Left serous otitis media, unspecified chronicity  amoxicillin-clavulanate 875-125mg (AUGMENTIN) 875-125 mg per tablet      4. Mixed conductive and sensorineural hearing loss of left ear with restricted hearing of right ear        5. Right ear impacted cerumen        6. Acute foreign body of ear canal, left, initial encounter        7. Nasopharyngitis acute            Discussion and Plan:    Reviewed history, symptoms, exam findings and audiogram.    Reviewed anatomy and possible causes of middle ear fluid on the left.    Discussed results of nasopharyngoscopy.    Discussed options for treatment and reasons at length. Conservative trial of abx and nasal steroids with auto-insufflation in lieu of myringotomy at first.    Recommend:  Augmentin 875 mg by mouth twice a day for 10 days.    Flonase 2 sprays each nostril daily. We discussed how to apply it appropriately by placing the spray nozzle inside the nostril pointing above the ear on the same side and gently breathing in through the nose (do NOT snort).  If Flonase not available may use Nasacort or Nasonex over the counter.    Try to gently autoinsufflate (pop ear) ear at least twice a day.    Use hearing protection when around loud noise while working.    Follow up in about a month to recheck. Expectations of gradual improvement discussed.        Parts or all of this note were created by voice recognition software; typographical errors in translating may be present.

## 2024-11-13 NOTE — PATIENT INSTRUCTIONS
Reviewed history, symptoms, exam findings and audiogram.    Reviewed anatomy and possible causes of middle ear fluid on the left.    Discussed results of nasopharyngoscopy.    Recommend:  Augmentin 875 mg by mouth twice a day for 10 days.    Flonase 2 sprays each nostril daily. We discussed how to apply it appropriately by placing the spray nozzle inside the nostril pointing above the ear on the same side and gently breathing in through the nose (do NOT snort).  If Flonase not available may use Nasacort or Nasonex over the counter.    Try to gently autoinsufflate (pop ear) ear at least twice a day.    Use hearing protection when around loud noise while working.    Follow up in about a month to recheck. Expectations of gradual improvement discussed.

## 2024-11-13 NOTE — PROGRESS NOTES
Paulo Anderson was seen today in the clinic for an audiologic evaluation.  Patient's main complaint was a decrease in hearing on his left side.  Mr. Anderson reported that he can occasionally hear what sounds like water swooshing inside his left ear. He denied any other otologic symptoms.     Tympanometry revealed Type B tympanogram with normal ear canal volume in the right ear and Type B tympanogram with normal ear canal volume in the left ear.     Audiogram results revealed normal hearing sloping to a moderate rising to mild sensorineural hearing loss in the right ear and slight sloping to severe rising to moderate mixed hearing loss in the left ear.      Speech reception thresholds were noted at 15 dB in the right ear and 30 dB in the left ear.    Speech discrimination scores were 100% in the right ear and 80% in the left ear.    Recommendations:  Otologic evaluation  Repeat audiogram following medical intervention or at ENT follow up  Hearing aid consultation following medical clearance   Hearing protection when in noise

## 2024-12-11 ENCOUNTER — OFFICE VISIT (OUTPATIENT)
Dept: OTOLARYNGOLOGY | Facility: CLINIC | Age: 59
End: 2024-12-11
Payer: MEDICAID

## 2024-12-11 DIAGNOSIS — H65.92 LEFT SEROUS OTITIS MEDIA, UNSPECIFIED CHRONICITY: ICD-10-CM

## 2024-12-11 DIAGNOSIS — H69.92 ETD (EUSTACHIAN TUBE DYSFUNCTION), LEFT: Primary | ICD-10-CM

## 2024-12-11 PROCEDURE — 99213 OFFICE O/P EST LOW 20 MIN: CPT | Mod: PBBFAC | Performed by: OTOLARYNGOLOGY

## 2024-12-11 PROCEDURE — 99213 OFFICE O/P EST LOW 20 MIN: CPT | Mod: S$PBB,,, | Performed by: OTOLARYNGOLOGY

## 2024-12-11 PROCEDURE — 1159F MED LIST DOCD IN RCRD: CPT | Mod: CPTII,,, | Performed by: OTOLARYNGOLOGY

## 2024-12-11 PROCEDURE — 99999 PR PBB SHADOW E&M-EST. PATIENT-LVL III: CPT | Mod: PBBFAC,,, | Performed by: OTOLARYNGOLOGY

## 2024-12-11 PROCEDURE — 1160F RVW MEDS BY RX/DR IN RCRD: CPT | Mod: CPTII,,, | Performed by: OTOLARYNGOLOGY

## 2024-12-11 PROCEDURE — 4010F ACE/ARB THERAPY RXD/TAKEN: CPT | Mod: CPTII,,, | Performed by: OTOLARYNGOLOGY

## 2024-12-11 RX ORDER — CEFDINIR 300 MG/1
300 CAPSULE ORAL 2 TIMES DAILY
Qty: 20 CAPSULE | Refills: 0 | Status: SHIPPED | OUTPATIENT
Start: 2024-12-11 | End: 2024-12-21

## 2024-12-11 NOTE — PROGRESS NOTES
60 y/o male returns as scheduled. Took abx w/o issue. Using Flonase. Has not been able to pop ear but has tried. Left ear hearing still muffled. No ear pain or drainage. No other new symptoms.    Wife had STEF yesterday. Got home this morning.    11/13/24 visit HPI  59-year-old male who presents with his wife for evaluation of trouble with his left ear and hearing.  Patient reports noticing some hearing loss.  He has probably had trouble for about a year.  He was told there was a lot of wax in the left ear.  He tried to use Debrox numerous times but still feels like there is an issue.  The amount of hearing loss fluctuates.  His right ear feels stronger than the left.  At times he feels fluid in the left ear.  He tries not to get a lot of fluid in the ear when he showers.  He sometimes will in his able to turn his head and get it out but it still does not feel like the hearing is improved.  He denies having trouble with his hearing as a kid.  He had no trouble with ear infections.  No prior tympanostomy tubes.  He did start Dupixent for his psoriasis about a year ago. Water sloshing around in left ear.    No other otologic symptoms. Some congestion at times with exposure. 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 Does not always wear hearing protection b/c of pressure the ear muffs apply. No other family hx of HL. No smoking hx.    Has had some back and knee issues this past year.    PMHx, PSHx, Meds, Allergies, SocHx, FamHx reviewed in EPIC    Review of Systems     Constitutional: Negative for appetite change, chills, fatigue, fever and unexpected weight loss.      HENT: Positive for hearing loss.  Negative for ear discharge, ear pain, postnasal drip, runny nose, sinus infection, sinus pressure, stuffy nose, trouble swallowing and voice change.      Eyes:  Negative for change in eyesight, eye drainage, eye itching and photophobia.     Respiratory:  Negative for cough, shortness of breath, sleep apnea, snoring and wheezing.       Cardiovascular:  Negative for chest pain, foot swelling, irregular heartbeat and swollen veins.     Gastrointestinal:  Positive for heartburn. Negative for abdominal pain and acid reflux.     Genitourinary: Negative for difficulty urinating, sexual problems and frequent urination.     Musc: Negative for aching joints, aching muscles, back pain and neck pain.     Skin: Negative for rash.     Allergy: Negative for food allergies and seasonal allergies.     Endocrine: Negative for cold intolerance and heat intolerance.      Neurological: Negative for dizziness, headaches, light-headedness, seizures and tremors.      Hematologic: Negative for bruises/bleeds easily and swollen glands.      Psychiatric: Negative for decreased concentration, depression, nervous/anxious and sleep disturbance.            PE: RR 16, AF   Gen: male, well nourished, well developed, NAD, cooperative, good historian. Hear with his wife.  Ears:  EAC bilaterally clear & TM translucent with normal bony landmarks on right; TM on left with davi JOSE with AF level mid TM (patient cannot insufflate)  Nose: external nose wnl, nasal septum some fluctuation, inferior turbinates mild edema, no visible purulence or polyps  OC/OP:  MMM, tongue protrudes midline, palate raises symmetrically, tonsils small and symmetric, no erythema or exudate  Neck: supple, no TTP, no LAD or masses  Face:  no TTP, no erythema or flushing  Respiratory: Breathing comfortably without retractions  Skin: facial skin intact without visible lesions or flushing  Lymph: no neck lympadenopathy  Neuro:  facial movement symmetric, speech fluid, gait stable, tongue protrudes midline  Psych: alert & oriented x 3, reasonable, normal affect    Older tymp    LAST AUDIO              Impression:   1. ETD (Eustachian tube dysfunction), left        2. Left serous otitis media, unspecified chronicity  cefdinir (OMNICEF) 300 MG capsule    mild improvement          Discussion and Plan:     Discussed history and symptoms since last visit.  CHECKED NP at last visit - mild nodularity to adenoids.    Use Afrin Nasal Spray - 2 sprays each nostril 2 times per day for 3 DAYS ONLY.  After applying he should gently try to pop his ears 2-3 times per day.     Continue to use Flonase daily.    Take cefdinir 300 mg twice daily for 10 days.     Follow up in January - 1/22/25 to reassess. May need tympanostomy tube if not improved.    Otherwise, follow up with our clinic for any ear, nose or throat problems ([phone]).         Parts or all of this note were created by voice recognition software; typographical errors in translating may be present.

## 2024-12-11 NOTE — PATIENT INSTRUCTIONS
Discussed history and symptoms since last visit.    Use Afrin Nasal Spray - 2 sprays each nostril 2 times per day for 3 DAYS ONLY.  After applying he should gently try to pop his ears 2-3 times per day.     Continue to use Flonase daily.    Take cefdinir 300 mg twice daily for 10 days.     Follow up in January - 1/22/25 to reassess. May need tympanostomy tube if not improved.    Otherwise, follow up with our clinic for any ear, nose or throat problems ([phone]).

## 2025-03-19 ENCOUNTER — OFFICE VISIT (OUTPATIENT)
Dept: OTOLARYNGOLOGY | Facility: CLINIC | Age: 60
End: 2025-03-19
Payer: MEDICAID

## 2025-03-19 ENCOUNTER — CLINICAL SUPPORT (OUTPATIENT)
Dept: AUDIOLOGY | Facility: CLINIC | Age: 60
End: 2025-03-19
Payer: MEDICAID

## 2025-03-19 VITALS — SYSTOLIC BLOOD PRESSURE: 116 MMHG | HEART RATE: 77 BPM | DIASTOLIC BLOOD PRESSURE: 80 MMHG

## 2025-03-19 DIAGNOSIS — H69.92 ETD (EUSTACHIAN TUBE DYSFUNCTION), LEFT: Primary | ICD-10-CM

## 2025-03-19 DIAGNOSIS — H90.A21 SENSORINEURAL HEARING LOSS (SNHL) OF RIGHT EAR WITH RESTRICTED HEARING OF LEFT EAR: ICD-10-CM

## 2025-03-19 DIAGNOSIS — H90.A32 MIXED CONDUCTIVE AND SENSORINEURAL HEARING LOSS OF LEFT EAR WITH RESTRICTED HEARING OF RIGHT EAR: Primary | ICD-10-CM

## 2025-03-19 DIAGNOSIS — H90.3 SENSORINEURAL HEARING LOSS (SNHL) OF BOTH EARS: ICD-10-CM

## 2025-03-19 DIAGNOSIS — H65.92 LEFT SEROUS OTITIS MEDIA, UNSPECIFIED CHRONICITY: ICD-10-CM

## 2025-03-19 PROCEDURE — 1160F RVW MEDS BY RX/DR IN RCRD: CPT | Mod: CPTII,,, | Performed by: OTOLARYNGOLOGY

## 2025-03-19 PROCEDURE — 92567 TYMPANOMETRY: CPT | Mod: PBBFAC

## 2025-03-19 PROCEDURE — 99213 OFFICE O/P EST LOW 20 MIN: CPT | Mod: PBBFAC | Performed by: OTOLARYNGOLOGY

## 2025-03-19 PROCEDURE — 99213 OFFICE O/P EST LOW 20 MIN: CPT | Mod: S$PBB,,, | Performed by: OTOLARYNGOLOGY

## 2025-03-19 PROCEDURE — 3074F SYST BP LT 130 MM HG: CPT | Mod: CPTII,,, | Performed by: OTOLARYNGOLOGY

## 2025-03-19 PROCEDURE — 99999 PR PBB SHADOW E&M-EST. PATIENT-LVL III: CPT | Mod: PBBFAC,,, | Performed by: OTOLARYNGOLOGY

## 2025-03-19 PROCEDURE — 1159F MED LIST DOCD IN RCRD: CPT | Mod: CPTII,,, | Performed by: OTOLARYNGOLOGY

## 2025-03-19 PROCEDURE — 92557 COMPREHENSIVE HEARING TEST: CPT | Mod: PBBFAC

## 2025-03-19 PROCEDURE — 3079F DIAST BP 80-89 MM HG: CPT | Mod: CPTII,,, | Performed by: OTOLARYNGOLOGY
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
## 2025-03-19 NOTE — PATIENT INSTRUCTIONS
Reviewed history, symptoms, exam findings, and audiogram.    Left sided serous otitis media has thankfully resolved.    Discussed hearing levels.  Recommend using hearing protection when around loud noise.    Follow up in one year with audiogram.     Otherwise, follow up with our clinic for any ear, nose or throat problems ([phone]).

## 2025-03-19 NOTE — PROGRESS NOTES
Paulo Anderson, a 59 y.o. male, was seen today in the clinic for an audiologic evaluation. Mr. Anderson's last audiogram on 11/13/2024 revealed normal hearing sloping to a moderate rising to mild sensorineural hearing loss in the right ear and slight sloping to severe rising to moderate mixed hearing loss in the left ear. Mr. Anderson reported some perceived improvement in the left ear since his last audiogram. Patient denied tinnitus, otalgia, aural fullness, and dizziness.    Tympanometry revealed Type C tympanogram in the right ear and Type C tympanogram in the left ear. Audiogram results revealed normal hearing sensitivity sloping to mild to moderate high frequency sensorineural hearing loss in the right ear and normal hearing sensitivity sloping to mild to moderate high frequency mixed hearing loss and a conductive component noted at 1000 Hz in the left ear.  Speech reception thresholds were noted at 10 dBHL in the right ear and 15 dBHL in the left ear.  Speech discrimination scores were 92% in the right ear and 92% in the left ear.    Recommendations:  Otologic evaluation  Annual audiogram or sooner if change is perceived  Hearing protection in noise

## 2025-08-19 ENCOUNTER — HOSPITAL ENCOUNTER (OUTPATIENT)
Dept: RADIOLOGY | Facility: HOSPITAL | Age: 60
Discharge: HOME | End: 2025-08-19
Payer: MEDICAID

## 2025-08-19 DIAGNOSIS — M25.562 PAIN IN LEFT KNEE: ICD-10-CM

## 2025-08-19 DIAGNOSIS — G89.29 OTHER CHRONIC PAIN: ICD-10-CM

## 2025-08-19 PROCEDURE — 73562 X-RAY EXAM OF KNEE 3: CPT | Mod: TC,LT

## 2025-08-19 PROCEDURE — 73562 X-RAY EXAM OF KNEE 3: CPT | Mod: 26,LT,, | Performed by: RADIOLOGY

## (undated) DEVICE — SYR 10CC LUER LOCK

## (undated) DEVICE — SKINMARKER W/RULER DEVON

## (undated) DEVICE — DRAPE C ARM 42 X 120 10/BX

## (undated) DEVICE — NDL HYPO REG 25G X 1 1/2

## (undated) DEVICE — GAUZE SPONGE 4X4 12PLY

## (undated) DEVICE — DURAPREP SURG SCRUB 26ML

## (undated) DEVICE — TOWEL OR DISP STRL BLUE 4/PK

## (undated) DEVICE — ALCOHOL 70% ISOP W/GREEN 16OZ

## (undated) DEVICE — COVER OVERHEAD SURG LT BLUE

## (undated) DEVICE — BANDAGE ADHESIVE PLAS STRL 1X3

## (undated) DEVICE — GLOVE BIOGEL PI MICRO INDIC 8

## (undated) DEVICE — DRAPE THREE-QTR REINF 53X77IN

## (undated) DEVICE — DRESSING TEGADERM 2 3/8 X 2.75

## (undated) DEVICE — GLOVE BIOGEL ORTHOPEDIC 7.5